# Patient Record
Sex: MALE | Race: WHITE | HISPANIC OR LATINO
[De-identification: names, ages, dates, MRNs, and addresses within clinical notes are randomized per-mention and may not be internally consistent; named-entity substitution may affect disease eponyms.]

---

## 2019-09-17 ENCOUNTER — APPOINTMENT (OUTPATIENT)
Dept: COLORECTAL SURGERY | Facility: CLINIC | Age: 29
End: 2019-09-17
Payer: MEDICAID

## 2019-09-17 VITALS
SYSTOLIC BLOOD PRESSURE: 126 MMHG | DIASTOLIC BLOOD PRESSURE: 82 MMHG | WEIGHT: 299 LBS | RESPIRATION RATE: 14 BRPM | HEIGHT: 68 IN | BODY MASS INDEX: 45.31 KG/M2 | HEART RATE: 70 BPM

## 2019-09-17 DIAGNOSIS — K62.3 RECTAL PROLAPSE: ICD-10-CM

## 2019-09-17 PROCEDURE — 99204 OFFICE O/P NEW MOD 45 MIN: CPT

## 2019-09-27 PROBLEM — K62.3 PARTIAL RECTAL PROLAPSE: Status: ACTIVE | Noted: 2019-09-27

## 2019-09-27 NOTE — PHYSICAL EXAM
[Abdomen Masses] : No abdominal masses [Abdomen Tenderness] : ~T No ~M abdominal tenderness [Tender] : nontender [Normal rectal exam] : exam was normal [Manually Reducible] : a manually reducible (grade III) [Tender, Swollen] : tender, swollen [Tight] : was tight [Posterior] : posteriorly [None] : there was no rectal mass  [JVD] : no jugular venous distention  [Normal Breath Sounds] : Normal breath sounds [Normal Heart Sounds] : normal heart sounds [Normal Rate and Rhythm] : normal rate and rhythm [No Rash or Lesion] : No rash or lesion [Alert] : alert [Oriented to Person] : oriented to person [Oriented to Place] : oriented to place [Oriented to Time] : oriented to time [Calm] : calm [de-identified] : Obese [de-identified] : Grade 3 [de-identified] : Looks well in no distress, of stated age. [de-identified] : pupils equal reactive to light normocephalic atraumatic. [de-identified] : moves all 4 extremities appropriately with 5 over 5 strength

## 2019-09-27 NOTE — HISTORY OF PRESENT ILLNESS
[FreeTextEntry1] : 29-year-old male with long-standing history of prolapsing bleeding hemorrhoids or who had a hemorrhage surgery a year ago still with symptoms of bleeding and prolapsing hemorrhoids and painful bowel movements

## 2019-09-27 NOTE — ASSESSMENT
[FreeTextEntry1] : 29-year-old male with recurrence of extensive hemorrhoids and bleeding. Recommend surgery with hemorrhoidectomy repair prolapse possible fissurectomy.Risk and benefits of the surgery have been discussed which include bleeding, infection, sepsis, multiorgan failure, inadvertent injury including hollow viscus, solid organ, neurovascular and neurological structures, DVT PE, heart attack, stroke, hernias, recurrence, Incontinence and death.

## 2019-10-01 ENCOUNTER — OTHER (OUTPATIENT)
Age: 29
End: 2019-10-01

## 2019-10-01 RX ORDER — HYDROCORTISONE 25 MG/G
2.5 CREAM TOPICAL TWICE DAILY
Qty: 1 | Refills: 3 | Status: ACTIVE | COMMUNITY
Start: 2019-10-01 | End: 1900-01-01

## 2019-12-27 ENCOUNTER — OUTPATIENT (OUTPATIENT)
Dept: OUTPATIENT SERVICES | Facility: HOSPITAL | Age: 29
LOS: 1 days | End: 2019-12-27
Payer: MEDICAID

## 2019-12-27 VITALS
WEIGHT: 315 LBS | TEMPERATURE: 98 F | HEART RATE: 80 BPM | RESPIRATION RATE: 14 BRPM | DIASTOLIC BLOOD PRESSURE: 78 MMHG | OXYGEN SATURATION: 98 % | SYSTOLIC BLOOD PRESSURE: 114 MMHG | HEIGHT: 67 IN

## 2019-12-27 DIAGNOSIS — Z98.890 OTHER SPECIFIED POSTPROCEDURAL STATES: Chronic | ICD-10-CM

## 2019-12-27 DIAGNOSIS — Z98.89 OTHER SPECIFIED POSTPROCEDURAL STATES: Chronic | ICD-10-CM

## 2019-12-27 DIAGNOSIS — Z01.818 ENCOUNTER FOR OTHER PREPROCEDURAL EXAMINATION: ICD-10-CM

## 2019-12-27 DIAGNOSIS — K64.9 UNSPECIFIED HEMORRHOIDS: Chronic | ICD-10-CM

## 2019-12-27 DIAGNOSIS — K64.8 OTHER HEMORRHOIDS: ICD-10-CM

## 2019-12-27 LAB
ANION GAP SERPL CALC-SCNC: 4 MMOL/L — LOW (ref 5–17)
APTT BLD: 31.8 SEC — SIGNIFICANT CHANGE UP (ref 27.5–36.3)
BASOPHILS # BLD AUTO: 0.03 K/UL — SIGNIFICANT CHANGE UP (ref 0–0.2)
BASOPHILS NFR BLD AUTO: 0.4 % — SIGNIFICANT CHANGE UP (ref 0–2)
BUN SERPL-MCNC: 12 MG/DL — SIGNIFICANT CHANGE UP (ref 7–23)
CALCIUM SERPL-MCNC: 8.8 MG/DL — SIGNIFICANT CHANGE UP (ref 8.5–10.1)
CHLORIDE SERPL-SCNC: 106 MMOL/L — SIGNIFICANT CHANGE UP (ref 96–108)
CO2 SERPL-SCNC: 27 MMOL/L — SIGNIFICANT CHANGE UP (ref 22–31)
CREAT SERPL-MCNC: 0.82 MG/DL — SIGNIFICANT CHANGE UP (ref 0.5–1.3)
EOSINOPHIL # BLD AUTO: 0.28 K/UL — SIGNIFICANT CHANGE UP (ref 0–0.5)
EOSINOPHIL NFR BLD AUTO: 3.6 % — SIGNIFICANT CHANGE UP (ref 0–6)
GLUCOSE SERPL-MCNC: 74 MG/DL — SIGNIFICANT CHANGE UP (ref 70–99)
HCT VFR BLD CALC: 45.9 % — SIGNIFICANT CHANGE UP (ref 39–50)
HGB BLD-MCNC: 15.4 G/DL — SIGNIFICANT CHANGE UP (ref 13–17)
IMM GRANULOCYTES NFR BLD AUTO: 0.4 % — SIGNIFICANT CHANGE UP (ref 0–1.5)
INR BLD: 0.92 RATIO — SIGNIFICANT CHANGE UP (ref 0.88–1.16)
LYMPHOCYTES # BLD AUTO: 2.91 K/UL — SIGNIFICANT CHANGE UP (ref 1–3.3)
LYMPHOCYTES # BLD AUTO: 37.2 % — SIGNIFICANT CHANGE UP (ref 13–44)
MCHC RBC-ENTMCNC: 24.8 PG — LOW (ref 27–34)
MCHC RBC-ENTMCNC: 33.6 GM/DL — SIGNIFICANT CHANGE UP (ref 32–36)
MCV RBC AUTO: 73.8 FL — LOW (ref 80–100)
MONOCYTES # BLD AUTO: 0.79 K/UL — SIGNIFICANT CHANGE UP (ref 0–0.9)
MONOCYTES NFR BLD AUTO: 10.1 % — SIGNIFICANT CHANGE UP (ref 2–14)
NEUTROPHILS # BLD AUTO: 3.79 K/UL — SIGNIFICANT CHANGE UP (ref 1.8–7.4)
NEUTROPHILS NFR BLD AUTO: 48.3 % — SIGNIFICANT CHANGE UP (ref 43–77)
PLATELET # BLD AUTO: 246 K/UL — SIGNIFICANT CHANGE UP (ref 150–400)
POTASSIUM SERPL-MCNC: 4.1 MMOL/L — SIGNIFICANT CHANGE UP (ref 3.5–5.3)
POTASSIUM SERPL-SCNC: 4.1 MMOL/L — SIGNIFICANT CHANGE UP (ref 3.5–5.3)
PROTHROM AB SERPL-ACNC: 10.2 SEC — SIGNIFICANT CHANGE UP (ref 10–12.9)
RBC # BLD: 6.22 M/UL — HIGH (ref 4.2–5.8)
RBC # FLD: 14.5 % — SIGNIFICANT CHANGE UP (ref 10.3–14.5)
SODIUM SERPL-SCNC: 137 MMOL/L — SIGNIFICANT CHANGE UP (ref 135–145)
WBC # BLD: 7.83 K/UL — SIGNIFICANT CHANGE UP (ref 3.8–10.5)
WBC # FLD AUTO: 7.83 K/UL — SIGNIFICANT CHANGE UP (ref 3.8–10.5)

## 2019-12-27 PROCEDURE — 93010 ELECTROCARDIOGRAM REPORT: CPT

## 2019-12-27 PROCEDURE — 85025 COMPLETE CBC W/AUTO DIFF WBC: CPT

## 2019-12-27 PROCEDURE — 71046 X-RAY EXAM CHEST 2 VIEWS: CPT

## 2019-12-27 PROCEDURE — 36415 COLL VENOUS BLD VENIPUNCTURE: CPT

## 2019-12-27 PROCEDURE — 80048 BASIC METABOLIC PNL TOTAL CA: CPT

## 2019-12-27 PROCEDURE — 71046 X-RAY EXAM CHEST 2 VIEWS: CPT | Mod: 26

## 2019-12-27 PROCEDURE — 85610 PROTHROMBIN TIME: CPT

## 2019-12-27 PROCEDURE — 86901 BLOOD TYPING SEROLOGIC RH(D): CPT

## 2019-12-27 PROCEDURE — 86900 BLOOD TYPING SEROLOGIC ABO: CPT

## 2019-12-27 PROCEDURE — 85730 THROMBOPLASTIN TIME PARTIAL: CPT

## 2019-12-27 PROCEDURE — G0463: CPT | Mod: 25

## 2019-12-27 PROCEDURE — 93005 ELECTROCARDIOGRAM TRACING: CPT

## 2019-12-27 PROCEDURE — 86850 RBC ANTIBODY SCREEN: CPT

## 2019-12-27 NOTE — H&P PST ADULT - HISTORY OF PRESENT ILLNESS
30 yo male presents to PST. c/o hemorrhoids "for years". Reports having banding procedure with anesthesia with a different MD approx 1 year ago. Denies relief of pain or bleeding after BMs. Consulted with Dr Panda & is coming in for hemorrhoidectomy. Is currently using preparation H with little relief. Pt plans on calling surgeon's office to get different topical medication.

## 2019-12-27 NOTE — H&P PST ADULT - MUSCULOSKELETAL COMMENTS
followed by spine MD for low back pain. Reports he will start Physical therapy after hemorrhoid surgery

## 2019-12-27 NOTE — H&P PST ADULT - NSICDXPASTSURGICALHX_GEN_ALL_CORE_FT
PAST SURGICAL HISTORY:  H/O colonoscopy     Hemorrhoid hemorrhoid banding 2018    History of esophagogastroduodenoscopy (EGD)     S/P nasal surgery fracture

## 2019-12-27 NOTE — H&P PST ADULT - ASSESSMENT
30 yo male scheduled for hemorrhoidectomy on 1/6/20  with Dr. Panda     1. CBC w diff, BMP, PTT, PT/INR, T&S  2. EKG  3. discussed EZ sponges, NPO after MN & PST day of procedure instructions  4. Instructed to increase po fluids the day before surgery. Instructed to hold aspirin, NSAIDs, fish oil, vitamins & herbal supplements 7 days prior to surgery  5. CXR

## 2019-12-27 NOTE — H&P PST ADULT - NSCAGESTDRUGGUILT_GEN_A_CORE_SD
PHYSICAL THERAPY - DAILY TREATMENT NOTE    Patient Name: Dontae Tovar        Date: 10/29/2019  : 1967   YES Patient  Verified  Visit #:     Insurance: Payor: Alejandra Gill / Plan: [id number] CCC[facility] / Product Type: Managed Care Medicaid /      In time: 11:00 Out time: 11:35   Total Treatment Time: 35     Medicare Time Tracking (below)   Total Timed Codes (min):  na 1:1 Treatment Time:  na     TREATMENT AREA =  Low back pain [M54.5]    SUBJECTIVE  Pain Level (on 0 to 10 scale):    Medication Changes/New allergies or changes in medical history, any new surgeries or procedures? NO    If yes, update Summary List   Subjective Functional Status/Changes:  []  No changes reported     SEE IE          OBJECTIVE    10 min Manual Therapy: DTM para, shot gun tech, L3-5 L FRS rae   Rationale:      decrease pain, increase ROM and increase tissue extensibility to improve patient's ability to perform ADLs    5 min Self Care: Bridge and t/s ROt   Rationale:    increase ROM, increase strength and improve coordination to improve the patients ability to perform ADLs      Billed With/As:   [] TE   [] TA   [] Neuro   [x] Self Care Patient Education: [x] Review HEP    [] Progressed/Changed HEP based on:   [] positioning   [] body mechanics   [] transfers   [] heat/ice application    [] other:       min Patient Education:  YES  Reviewed HEP   []  Progressed/Changed HEP based on:         Other Objective/Functional Measures:    SEE IE     Post Treatment Pain Level (on 0 to 10) scale:       ASSESSMENT  Assessment/Changes in Function:     SEE IE     []  See Progress Note/Recertification   Patient will continue to benefit from skilled PT services to modify and progress therapeutic interventions, address functional mobility deficits, address ROM deficits, address strength deficits, analyze and address soft tissue restrictions, analyze and cue movement patterns, analyze and modify body mechanics/ergonomics and assess and modify postural abnormalities to attain remaining goals.    Progress toward goals / Updated goals:         PLAN  []  Upgrade activities as tolerated YES Continue plan of care   []  Discharge due to :    []  Other:      Therapist: Kathi Ly, PT, OCS, SCS, CSCS    Date: 10/29/2019 Time: 1:35 PM       Future Appointments   Date Time Provider Erica Barron   11/6/2019  2:30 PM Victor Hugo [facility]   11/13/2019  2:30 PM Beny Velazquez [facility]   11/20/2019  2:30 PM Paulette Gay [facility]   11/27/2019  2:30 PM Victor Hugo [facility]   12/12/2019  2:30 PM Julio C Kitchen NP [facility] no

## 2019-12-27 NOTE — H&P PST ADULT - NSICDXPASTMEDICALHX_GEN_ALL_CORE_FT
PAST MEDICAL HISTORY:  Bulging lumbar disc     Chronic urticaria     Difficulty urinating on tamsulosin    Gastroesophageal reflux disease without esophagitis     Hemorrhoids     Hypertension     Low back pain     Morbid obesity     Rectal pain     Sleep apnea uses CPAP machine

## 2019-12-28 DIAGNOSIS — Z01.818 ENCOUNTER FOR OTHER PREPROCEDURAL EXAMINATION: ICD-10-CM

## 2019-12-28 DIAGNOSIS — K64.8 OTHER HEMORRHOIDS: ICD-10-CM

## 2019-12-31 ENCOUNTER — OTHER (OUTPATIENT)
Age: 29
End: 2019-12-31

## 2019-12-31 PROBLEM — I10 ESSENTIAL (PRIMARY) HYPERTENSION: Chronic | Status: ACTIVE | Noted: 2019-12-27

## 2019-12-31 PROBLEM — K64.9 UNSPECIFIED HEMORRHOIDS: Chronic | Status: ACTIVE | Noted: 2019-12-27

## 2019-12-31 PROBLEM — M51.26 OTHER INTERVERTEBRAL DISC DISPLACEMENT, LUMBAR REGION: Chronic | Status: ACTIVE | Noted: 2019-12-27

## 2019-12-31 PROBLEM — G47.30 SLEEP APNEA, UNSPECIFIED: Chronic | Status: ACTIVE | Noted: 2019-12-27

## 2019-12-31 PROBLEM — L50.8 OTHER URTICARIA: Chronic | Status: ACTIVE | Noted: 2019-12-27

## 2019-12-31 PROBLEM — K62.89 OTHER SPECIFIED DISEASES OF ANUS AND RECTUM: Chronic | Status: ACTIVE | Noted: 2019-12-27

## 2019-12-31 PROBLEM — E66.01 MORBID (SEVERE) OBESITY DUE TO EXCESS CALORIES: Chronic | Status: ACTIVE | Noted: 2019-12-27

## 2019-12-31 PROBLEM — R39.198 OTHER DIFFICULTIES WITH MICTURITION: Chronic | Status: ACTIVE | Noted: 2019-12-27

## 2019-12-31 PROBLEM — M54.5 LOW BACK PAIN: Chronic | Status: ACTIVE | Noted: 2019-12-27

## 2019-12-31 RX ORDER — HYDROCORTISONE 25 MG/G
2.5 CREAM TOPICAL TWICE DAILY
Qty: 1 | Refills: 4 | Status: ACTIVE | COMMUNITY
Start: 2019-12-31 | End: 1900-01-01

## 2020-01-03 RX ORDER — SODIUM CHLORIDE 9 MG/ML
1000 INJECTION, SOLUTION INTRAVENOUS
Refills: 0 | Status: DISCONTINUED | OUTPATIENT
Start: 2020-01-06 | End: 2020-01-06

## 2020-01-03 RX ORDER — OXYCODONE HYDROCHLORIDE 5 MG/1
10 TABLET ORAL ONCE
Refills: 0 | Status: DISCONTINUED | OUTPATIENT
Start: 2020-01-06 | End: 2020-01-06

## 2020-01-03 RX ORDER — FENTANYL CITRATE 50 UG/ML
50 INJECTION INTRAVENOUS
Refills: 0 | Status: DISCONTINUED | OUTPATIENT
Start: 2020-01-06 | End: 2020-01-06

## 2020-01-06 ENCOUNTER — APPOINTMENT (OUTPATIENT)
Dept: COLORECTAL SURGERY | Facility: HOSPITAL | Age: 30
End: 2020-01-06
Payer: MEDICAID

## 2020-01-06 ENCOUNTER — OUTPATIENT (OUTPATIENT)
Dept: INPATIENT UNIT | Facility: HOSPITAL | Age: 30
LOS: 1 days | Discharge: HOME | End: 2020-01-06
Payer: MEDICAID

## 2020-01-06 ENCOUNTER — RESULT REVIEW (OUTPATIENT)
Age: 30
End: 2020-01-06

## 2020-01-06 VITALS
DIASTOLIC BLOOD PRESSURE: 95 MMHG | OXYGEN SATURATION: 98 % | WEIGHT: 315 LBS | TEMPERATURE: 99 F | HEIGHT: 67 IN | HEART RATE: 91 BPM | SYSTOLIC BLOOD PRESSURE: 148 MMHG | RESPIRATION RATE: 16 BRPM

## 2020-01-06 VITALS
SYSTOLIC BLOOD PRESSURE: 139 MMHG | HEART RATE: 82 BPM | TEMPERATURE: 98 F | OXYGEN SATURATION: 97 % | DIASTOLIC BLOOD PRESSURE: 89 MMHG | RESPIRATION RATE: 16 BRPM

## 2020-01-06 DIAGNOSIS — K64.9 UNSPECIFIED HEMORRHOIDS: Chronic | ICD-10-CM

## 2020-01-06 DIAGNOSIS — Z98.89 OTHER SPECIFIED POSTPROCEDURAL STATES: Chronic | ICD-10-CM

## 2020-01-06 DIAGNOSIS — Z98.890 OTHER SPECIFIED POSTPROCEDURAL STATES: Chronic | ICD-10-CM

## 2020-01-06 DIAGNOSIS — K64.8 OTHER HEMORRHOIDS: ICD-10-CM

## 2020-01-06 PROCEDURE — 46260 REMOVE IN/EX HEM GROUPS 2+: CPT

## 2020-01-06 PROCEDURE — 45505 REPAIR OF RECTUM: CPT | Mod: 59

## 2020-01-06 PROCEDURE — C9290: CPT

## 2020-01-06 PROCEDURE — 88304 TISSUE EXAM BY PATHOLOGIST: CPT

## 2020-01-06 PROCEDURE — 46200 REMOVAL OF ANAL FISSURE: CPT | Mod: 59

## 2020-01-06 PROCEDURE — 88304 TISSUE EXAM BY PATHOLOGIST: CPT | Mod: 26

## 2020-01-06 RX ORDER — OXYCODONE AND ACETAMINOPHEN 5; 325 MG/1; MG/1
1 TABLET ORAL EVERY 4 HOURS
Refills: 0 | Status: DISCONTINUED | OUTPATIENT
Start: 2020-01-06 | End: 2020-01-06

## 2020-01-06 RX ORDER — KETOROLAC TROMETHAMINE 30 MG/ML
1 SYRINGE (ML) INJECTION
Qty: 20 | Refills: 0
Start: 2020-01-06 | End: 2020-01-10

## 2020-01-06 RX ORDER — ONDANSETRON 8 MG/1
4 TABLET, FILM COATED ORAL ONCE
Refills: 0 | Status: COMPLETED | OUTPATIENT
Start: 2020-01-06 | End: 2020-01-06

## 2020-01-06 RX ORDER — PROCHLORPERAZINE MALEATE 5 MG
10 TABLET ORAL ONCE
Refills: 0 | Status: COMPLETED | OUTPATIENT
Start: 2020-01-06 | End: 2020-01-06

## 2020-01-06 RX ORDER — ONDANSETRON 8 MG/1
4 TABLET, FILM COATED ORAL EVERY 6 HOURS
Refills: 0 | Status: DISCONTINUED | OUTPATIENT
Start: 2020-01-06 | End: 2020-01-06

## 2020-01-06 RX ORDER — SODIUM CHLORIDE 9 MG/ML
3 INJECTION INTRAMUSCULAR; INTRAVENOUS; SUBCUTANEOUS EVERY 8 HOURS
Refills: 0 | Status: DISCONTINUED | OUTPATIENT
Start: 2020-01-06 | End: 2020-01-06

## 2020-01-06 RX ADMIN — FENTANYL CITRATE 50 MICROGRAM(S): 50 INJECTION INTRAVENOUS at 11:47

## 2020-01-06 RX ADMIN — Medication 10 MILLIGRAM(S): at 11:50

## 2020-01-06 RX ADMIN — SODIUM CHLORIDE 75 MILLILITER(S): 9 INJECTION, SOLUTION INTRAVENOUS at 11:22

## 2020-01-06 RX ADMIN — ONDANSETRON 4 MILLIGRAM(S): 8 TABLET, FILM COATED ORAL at 11:20

## 2020-01-06 RX ADMIN — FENTANYL CITRATE 50 MICROGRAM(S): 50 INJECTION INTRAVENOUS at 12:00

## 2020-01-06 NOTE — PHARMACOTHERAPY INTERVENTION NOTE - COMMENTS
Prescriber aware and approved. s/w dr tyrone md ok'd fentanyl to be given with demerol allergy. not true allergy per md. documented appoval in electronic order.

## 2020-01-06 NOTE — ASU DISCHARGE PLAN (ADULT/PEDIATRIC) - ASU DC SPECIAL INSTRUCTIONSFT
remove all gauze and shower with soap and water. apply new gauze daily and as needed. May use warm water to soak two-three times per day. may take colace and/or milk of magnesia as needed for constipation. ice packs to area as needed. May take Tylenol and/or motrin for mild pain. Take metamucil powder, fiber  daily with plenty of water.  FOLLOW UP IN 4 WEEKS.

## 2020-01-06 NOTE — ASU PATIENT PROFILE, ADULT - PMH
Bulging lumbar disc    Chronic urticaria    Difficulty urinating  on tamsulosin  Gastroesophageal reflux disease without esophagitis    Hemorrhoids    Hypertension    Low back pain    Morbid obesity    Rectal pain    Sleep apnea  uses CPAP machine

## 2020-01-06 NOTE — ASU PATIENT PROFILE, ADULT - PSH
H/O colonoscopy    Hemorrhoid  hemorrhoid banding 2018  History of esophagogastroduodenoscopy (EGD)    S/P nasal surgery  fracture

## 2020-01-06 NOTE — BRIEF OPERATIVE NOTE - NSICDXBRIEFPROCEDURE_GEN_ALL_CORE_FT
PROCEDURES:  Hemorrhoidectomy, internal and external, involving 2 or more anal columns 06-Jan-2020 10:37:39  Americo Soriano A

## 2020-01-06 NOTE — ASU DISCHARGE PLAN (ADULT/PEDIATRIC) - CARE PROVIDER_API CALL
Homero Panda)  ColonRectal Surgery; Surgery  [address] [place] [address]  Phone: [phone]  Fax: [phone]  Follow Up Time:

## 2020-01-08 DIAGNOSIS — G47.33 OBSTRUCTIVE SLEEP APNEA (ADULT) (PEDIATRIC): ICD-10-CM

## 2020-01-08 DIAGNOSIS — F12.20 CANNABIS DEPENDENCE, UNCOMPLICATED: ICD-10-CM

## 2020-01-08 DIAGNOSIS — K64.4 RESIDUAL HEMORRHOIDAL SKIN TAGS: ICD-10-CM

## 2020-01-08 DIAGNOSIS — M51.26 OTHER INTERVERTEBRAL DISC DISPLACEMENT, LUMBAR REGION: ICD-10-CM

## 2020-01-08 DIAGNOSIS — Z99.89 DEPENDENCE ON OTHER ENABLING MACHINES AND DEVICES: ICD-10-CM

## 2020-01-08 DIAGNOSIS — K21.9 GASTRO-ESOPHAGEAL REFLUX DISEASE WITHOUT ESOPHAGITIS: ICD-10-CM

## 2020-01-08 DIAGNOSIS — K64.8 OTHER HEMORRHOIDS: ICD-10-CM

## 2020-01-08 DIAGNOSIS — M54.9 DORSALGIA, UNSPECIFIED: ICD-10-CM

## 2020-01-08 DIAGNOSIS — K60.2 ANAL FISSURE, UNSPECIFIED: ICD-10-CM

## 2020-01-08 DIAGNOSIS — E66.01 MORBID (SEVERE) OBESITY DUE TO EXCESS CALORIES: ICD-10-CM

## 2020-01-08 DIAGNOSIS — I10 ESSENTIAL (PRIMARY) HYPERTENSION: ICD-10-CM

## 2020-01-13 RX ORDER — OXYCODONE AND ACETAMINOPHEN 5; 325 MG/1; MG/1
5-325 TABLET ORAL
Qty: 28 | Refills: 0 | Status: ACTIVE | COMMUNITY
Start: 2020-01-13 | End: 1900-01-01

## 2020-01-13 RX ORDER — POLYETHYLENE GLYCOL 3350 17 G/17G
17 POWDER, FOR SOLUTION ORAL DAILY
Qty: 1 | Refills: 2 | Status: ACTIVE | COMMUNITY
Start: 2020-01-13 | End: 1900-01-01

## 2020-01-14 RX ORDER — IBUPROFEN 600 MG/1
600 TABLET, FILM COATED ORAL
Qty: 28 | Refills: 0 | Status: ACTIVE | COMMUNITY
Start: 2020-01-14 | End: 1900-01-01

## 2020-01-14 RX ORDER — KETOROLAC TROMETHAMINE 10 MG/1
10 TABLET, FILM COATED ORAL EVERY 6 HOURS
Qty: 20 | Refills: 0 | Status: DISCONTINUED | COMMUNITY
Start: 2020-01-13 | End: 2020-01-14

## 2020-01-18 RX ORDER — LIDOCAINE 4 G/100G
1 CREAM TOPICAL
Qty: 1 | Refills: 0
Start: 2020-01-18

## 2020-01-18 RX ORDER — LIDOCAINE 4 G/100G
1 CREAM TOPICAL
Qty: 1 | Refills: 0
Start: 2020-01-18 | End: 2020-01-31

## 2020-01-18 RX ORDER — LIDOCAINE 4 G/100G
1 CREAM TOPICAL
Qty: 1 | Refills: 2
Start: 2020-01-18 | End: 2020-02-01

## 2020-02-07 ENCOUNTER — APPOINTMENT (OUTPATIENT)
Dept: COLORECTAL SURGERY | Facility: CLINIC | Age: 30
End: 2020-02-07
Payer: MEDICAID

## 2020-02-07 VITALS
TEMPERATURE: 98.5 F | HEIGHT: 68 IN | HEART RATE: 86 BPM | DIASTOLIC BLOOD PRESSURE: 95 MMHG | SYSTOLIC BLOOD PRESSURE: 143 MMHG | RESPIRATION RATE: 16 BRPM

## 2020-02-07 PROCEDURE — 99024 POSTOP FOLLOW-UP VISIT: CPT

## 2020-02-07 NOTE — HISTORY OF PRESENT ILLNESS
[FreeTextEntry1] : 30-year-old male postop hemorrhage surgery. Doing much better denies any pain or bleeding

## 2020-02-07 NOTE — PHYSICAL EXAM
[Normal rectal exam] : exam was normal [Excoriation] : no perianal excoriation [Fistula] : no fistulas [Tender, Swollen] : nontender, non-swollen [Normal] : was normal [None] : there was no rectal mass  [de-identified] : Looks well in no distress, of stated age.

## 2020-02-07 NOTE — ASSESSMENT
[FreeTextEntry1] : Postop hemorrhage surgery doing well.Recommend high fiber diet, Metamucil daily, sitz baths, stool softeners, pain medications p.r.n. also recommend consultation with bariatric surgery and her weight reduction

## 2020-09-04 ENCOUNTER — APPOINTMENT (OUTPATIENT)
Dept: COLORECTAL SURGERY | Facility: CLINIC | Age: 30
End: 2020-09-04

## 2020-09-15 ENCOUNTER — APPOINTMENT (OUTPATIENT)
Dept: COLORECTAL SURGERY | Facility: CLINIC | Age: 30
End: 2020-09-15
Payer: MEDICAID

## 2020-09-15 VITALS
BODY MASS INDEX: 47.74 KG/M2 | HEART RATE: 103 BPM | RESPIRATION RATE: 14 BRPM | SYSTOLIC BLOOD PRESSURE: 139 MMHG | WEIGHT: 315 LBS | TEMPERATURE: 97.1 F | HEIGHT: 68 IN | DIASTOLIC BLOOD PRESSURE: 89 MMHG

## 2020-09-15 DIAGNOSIS — K64.8 OTHER HEMORRHOIDS: ICD-10-CM

## 2020-09-15 PROCEDURE — 99213 OFFICE O/P EST LOW 20 MIN: CPT

## 2020-09-16 PROBLEM — K64.8 HEMORRHOIDS, INTERNAL: Status: ACTIVE | Noted: 2019-09-27

## 2020-09-16 NOTE — PHYSICAL EXAM
[Normal rectal exam] : exam was normal [Fistula] : no fistulas [Excoriation] : no perianal excoriation [Normal] : was normal [Tender, Swollen] : nontender, non-swollen [None] : no [de-identified] : Looks well in no distress, of stated age.

## 2020-10-06 ENCOUNTER — APPOINTMENT (OUTPATIENT)
Dept: UROLOGY | Facility: CLINIC | Age: 30
End: 2020-10-06

## 2020-11-10 ENCOUNTER — APPOINTMENT (OUTPATIENT)
Dept: UROLOGY | Facility: CLINIC | Age: 30
End: 2020-11-10

## 2020-11-24 ENCOUNTER — APPOINTMENT (OUTPATIENT)
Dept: PULMONOLOGY | Facility: CLINIC | Age: 30
End: 2020-11-24

## 2021-02-19 ENCOUNTER — RESULT REVIEW (OUTPATIENT)
Age: 31
End: 2021-02-19

## 2021-02-23 ENCOUNTER — APPOINTMENT (OUTPATIENT)
Dept: UROLOGY | Facility: CLINIC | Age: 31
End: 2021-02-23
Payer: MEDICAID

## 2021-02-23 VITALS
HEIGHT: 68 IN | SYSTOLIC BLOOD PRESSURE: 127 MMHG | BODY MASS INDEX: 47.74 KG/M2 | HEART RATE: 96 BPM | WEIGHT: 315 LBS | RESPIRATION RATE: 16 BRPM | TEMPERATURE: 97.6 F | DIASTOLIC BLOOD PRESSURE: 82 MMHG

## 2021-02-23 DIAGNOSIS — R39.9 UNSPECIFIED SYMPTOMS AND SIGNS INVOLVING THE GENITOURINARY SYSTEM: ICD-10-CM

## 2021-02-23 DIAGNOSIS — N50.82 SCROTAL PAIN: ICD-10-CM

## 2021-02-23 LAB
BILIRUB UR QL STRIP: NORMAL
CLARITY UR: CLEAR
COLLECTION METHOD: NORMAL
GLUCOSE UR-MCNC: NORMAL
HCG UR QL: 0.2 EU/DL
HGB UR QL STRIP.AUTO: NORMAL
KETONES UR-MCNC: NORMAL
LEUKOCYTE ESTERASE UR QL STRIP: NORMAL
NITRITE UR QL STRIP: NORMAL
PH UR STRIP: 5.5
PROT UR STRIP-MCNC: NORMAL
SP GR UR STRIP: 1.02

## 2021-02-23 PROCEDURE — 99204 OFFICE O/P NEW MOD 45 MIN: CPT | Mod: 25

## 2021-02-23 PROCEDURE — 99072 ADDL SUPL MATRL&STAF TM PHE: CPT

## 2021-02-23 PROCEDURE — 81003 URINALYSIS AUTO W/O SCOPE: CPT | Mod: QW

## 2021-02-23 NOTE — ASSESSMENT
[FreeTextEntry1] : Lower urinary tract symptoms:\par Discussed concern for urethral stricture. \par Recommended Uroflo/PVR and Cystoscopy for further work up. Patient agreeable.\par Continue Flomax.  \par \par Scrotal pain: \par Will get Scrotal Ultrasound. \par \par Return to clinic for next available Cystoscopy- will do Uroflo/PVR.

## 2021-02-23 NOTE — PHYSICAL EXAM
[General Appearance - In No Acute Distress] : no acute distress [Normal Appearance] : normal appearance [] : no respiratory distress [Abdomen Soft] : soft [Abdomen Tenderness] : non-tender [Costovertebral Angle Tenderness] : no ~M costovertebral angle tenderness [Urethral Meatus] : meatus normal [Penis Abnormality] : normal circumcised penis [Scrotum] : the scrotum was normal [Epididymis] : the epididymides were normal [Testes Tenderness] : no tenderness of the testes [Testes Mass (___cm)] : there were no testicular masses [Prostate Tenderness] : the prostate was not tender [No Prostate Nodules] : no prostate nodules [Prostate Size ___ (0-4)] : prostate size [unfilled] (scale: 0-4) [Normal Station and Gait] : the gait and station were normal for the patient's age [Skin Color & Pigmentation] : normal skin color and pigmentation [No Focal Deficits] : no focal deficits [Oriented To Time, Place, And Person] : oriented to person, place, and time [No Palpable Adenopathy] : no palpable adenopathy [FreeTextEntry1] : normal peripheral circulation

## 2021-02-23 NOTE — HISTORY OF PRESENT ILLNESS
[FreeTextEntry1] : 32 yo male presents for lower urinary tract symptoms. \par On Flomax. Reports variable stream, urinates every 2-3 hours or so during the day. Nocturia of 0-1 x. \par Endorses hesitancy, intermittency and post void dribbling. \par Denies dysuria, hematuria, lower abdominal or flank pain, fever, chills or rigors.\par Complaining of off and on scrotal pain. \par No history of trauma, catheterization or instrumentation. No history of sexually transmitted disease. \par Saw a Urologist last year and was told has enlarged prostate, before Flomax nocturia 1-2 x and had more \par hesitancy, intermittency and post void dribbling. Does not remember the name. \par Initially had retrograde ejaculation with Flomax, not anymore. \par Normal erections and libido. \par No family history of Prostate cancer. \par \par \par

## 2021-02-24 ENCOUNTER — TRANSCRIPTION ENCOUNTER (OUTPATIENT)
Age: 31
End: 2021-02-24

## 2021-03-09 ENCOUNTER — APPOINTMENT (OUTPATIENT)
Dept: UROLOGY | Facility: CLINIC | Age: 31
End: 2021-03-09

## 2022-02-01 ENCOUNTER — APPOINTMENT (OUTPATIENT)
Dept: COLORECTAL SURGERY | Facility: CLINIC | Age: 32
End: 2022-02-01
Payer: COMMERCIAL

## 2022-02-01 DIAGNOSIS — K62.5 HEMORRHAGE OF ANUS AND RECTUM: ICD-10-CM

## 2022-02-01 PROCEDURE — 99214 OFFICE O/P EST MOD 30 MIN: CPT | Mod: 25

## 2022-02-01 PROCEDURE — 46600 DIAGNOSTIC ANOSCOPY SPX: CPT

## 2022-02-02 PROBLEM — K62.5 ANAL BLEEDING: Status: ACTIVE | Noted: 2019-09-27

## 2022-02-02 NOTE — ASSESSMENT
[FreeTextEntry1] : 32-year-old male with recurrence of extensive hemorrhoids and bleeding. Recommend colonoscopy. Risks and benefits of colonoscopy have been discussed which include but not limited to bleeding, perforation, missing a cancer or polyp occurring 5%.\par Recommend high fiber diet, Metamucil daily, sitz baths, stool softeners, pain medications p.r.n.Recommend weight loss followup bariatric surgery

## 2022-02-02 NOTE — HISTORY OF PRESENT ILLNESS
[FreeTextEntry1] : 32year-old male with long-standing history of prolapsing bleeding hemorrhoids or who had a Hemorrhoid surgery a year ago still with symptoms of bleeding and prolapsing hemorrhoids and painful bowel movements. He is also gained a significant amount of weight

## 2022-02-02 NOTE — PHYSICAL EXAM
[Abdomen Masses] : No abdominal masses [Abdomen Tenderness] : ~T No ~M abdominal tenderness [Tender] : nontender [Normal rectal exam] : exam was normal [Manually Reducible] : a manually reducible (grade III) [Tender, Swollen] : tender, swollen [Tight] : was tight [Posterior] : posteriorly [None] : there was no rectal mass  [JVD] : no jugular venous distention  [Normal Breath Sounds] : Normal breath sounds [Normal Heart Sounds] : normal heart sounds [Normal Rate and Rhythm] : normal rate and rhythm [No Rash or Lesion] : No rash or lesion [Alert] : alert [Oriented to Person] : oriented to person [Oriented to Place] : oriented to place [Oriented to Time] : oriented to time [Calm] : calm [de-identified] : Obese [de-identified] : Grade 3 [de-identified] : Looks well in no distress, of stated age. [de-identified] : pupils equal reactive to light normocephalic atraumatic. [de-identified] : moves all 4 extremities appropriately with 5 over 5 strength

## 2022-03-07 RX ORDER — SODIUM SULFATE, POTASSIUM SULFATE, MAGNESIUM SULFATE 17.5; 3.13; 1.6 G/ML; G/ML; G/ML
17.5-3.13-1.6 SOLUTION, CONCENTRATE ORAL
Qty: 1 | Refills: 0 | Status: ACTIVE | COMMUNITY
Start: 2022-03-07 | End: 1900-01-01

## 2022-03-21 ENCOUNTER — NON-APPOINTMENT (OUTPATIENT)
Age: 32
End: 2022-03-21

## 2022-03-22 ENCOUNTER — APPOINTMENT (OUTPATIENT)
Dept: COLORECTAL SURGERY | Facility: CLINIC | Age: 32
End: 2022-03-22

## 2022-05-19 ENCOUNTER — APPOINTMENT (OUTPATIENT)
Dept: GASTROENTEROLOGY | Facility: CLINIC | Age: 32
End: 2022-05-19

## 2022-05-26 ENCOUNTER — APPOINTMENT (OUTPATIENT)
Dept: GASTROENTEROLOGY | Facility: CLINIC | Age: 32
End: 2022-05-26
Payer: COMMERCIAL

## 2022-05-26 VITALS — HEIGHT: 68 IN

## 2022-05-26 DIAGNOSIS — K21.9 GASTRO-ESOPHAGEAL REFLUX DISEASE W/OUT ESOPHAGITIS: ICD-10-CM

## 2022-05-26 PROCEDURE — 99204 OFFICE O/P NEW MOD 45 MIN: CPT

## 2022-05-27 PROBLEM — K21.9 CHRONIC GERD: Status: ACTIVE | Noted: 2022-05-27

## 2022-05-27 NOTE — HISTORY OF PRESENT ILLNESS
[de-identified] : Mr. CHANTELLE BROWNE is a 32 year old male with history of morbid obesity being evaluated for possible gastric bypass. Patient has no complaints of dysphagia. Patient does take daily omeprazole and has no complaints of heartburn. Patient has had several been having intermittent rectal bleeding and has been scheduled for colonoscopy. There is no family history of colon cancer.\par

## 2022-05-27 NOTE — ASSESSMENT
[FreeTextEntry1] : 33 yo male with history of GERD and history of rectal bleeding. Will arrange colonoscopy and upper endoscopy at [facility].

## 2022-06-22 LAB — SARS-COV-2 N GENE NPH QL NAA+PROBE: NOT DETECTED

## 2022-06-24 ENCOUNTER — APPOINTMENT (OUTPATIENT)
Dept: GASTROENTEROLOGY | Facility: HOSPITAL | Age: 32
End: 2022-06-24

## 2022-06-24 ENCOUNTER — RESULT REVIEW (OUTPATIENT)
Age: 32
End: 2022-06-24

## 2022-06-24 ENCOUNTER — OUTPATIENT (OUTPATIENT)
Dept: OUTPATIENT SERVICES | Facility: HOSPITAL | Age: 32
LOS: 1 days | Discharge: HOME | End: 2022-06-24
Payer: COMMERCIAL

## 2022-06-24 VITALS
SYSTOLIC BLOOD PRESSURE: 136 MMHG | HEIGHT: 68 IN | HEART RATE: 76 BPM | OXYGEN SATURATION: 100 % | WEIGHT: 315 LBS | DIASTOLIC BLOOD PRESSURE: 81 MMHG | TEMPERATURE: 98 F | RESPIRATION RATE: 21 BRPM

## 2022-06-24 DIAGNOSIS — Z98.89 OTHER SPECIFIED POSTPROCEDURAL STATES: Chronic | ICD-10-CM

## 2022-06-24 DIAGNOSIS — K64.9 UNSPECIFIED HEMORRHOIDS: Chronic | ICD-10-CM

## 2022-06-24 DIAGNOSIS — Z98.890 OTHER SPECIFIED POSTPROCEDURAL STATES: Chronic | ICD-10-CM

## 2022-06-24 DIAGNOSIS — K62.5 HEMORRHAGE OF ANUS AND RECTUM: ICD-10-CM

## 2022-06-24 DIAGNOSIS — K21.9 GASTRO-ESOPHAGEAL REFLUX DISEASE WITHOUT ESOPHAGITIS: ICD-10-CM

## 2022-06-24 PROCEDURE — 88312 SPECIAL STAINS GROUP 1: CPT | Mod: 26

## 2022-06-24 PROCEDURE — 43239 EGD BIOPSY SINGLE/MULTIPLE: CPT

## 2022-06-24 PROCEDURE — 88305 TISSUE EXAM BY PATHOLOGIST: CPT

## 2022-06-24 PROCEDURE — 45378 DIAGNOSTIC COLONOSCOPY: CPT

## 2022-06-24 PROCEDURE — 88312 SPECIAL STAINS GROUP 1: CPT

## 2022-06-24 PROCEDURE — 88305 TISSUE EXAM BY PATHOLOGIST: CPT | Mod: 26

## 2022-06-24 RX ORDER — TAMSULOSIN HYDROCHLORIDE 0.4 MG/1
1 CAPSULE ORAL
Qty: 0 | Refills: 0 | DISCHARGE

## 2022-06-24 RX ORDER — RANITIDINE HYDROCHLORIDE 150 MG/1
1 TABLET, FILM COATED ORAL
Qty: 0 | Refills: 0 | DISCHARGE

## 2022-06-24 RX ORDER — AMLODIPINE BESYLATE 2.5 MG/1
1 TABLET ORAL
Qty: 0 | Refills: 0 | DISCHARGE

## 2022-06-24 NOTE — ASU PATIENT PROFILE, ADULT - FALL HARM RISK - UNIVERSAL INTERVENTIONS
Bed in lowest position, wheels locked, appropriate side rails in place/Call bell, personal items and telephone in reach/Instruct patient to call for assistance before getting out of bed or chair/Non-slip footwear when patient is out of bed/Rover to call system/Physically safe environment - no spills, clutter or unnecessary equipment/Purposeful Proactive Rounding/Room/bathroom lighting operational, light cord in reach

## 2022-06-24 NOTE — ASU PATIENT PROFILE, ADULT - NSICDXPASTMEDICALHX_GEN_ALL_CORE_FT
PAST MEDICAL HISTORY:  Bulging lumbar disc     Chronic urticaria     Difficulty urinating on tamsulosin    Gastroesophageal reflux disease without esophagitis     Hemorrhoids     Hypertension     Low back pain     Morbid obesity     Rectal pain     Sleep apnea uses CPAP machine     PAST MEDICAL HISTORY:  Asthma     Bulging lumbar disc     Chronic urticaria     Difficulty urinating on tamsulosin    Gastroesophageal reflux disease without esophagitis     Hemorrhoids     Hypertension     Low back pain     Morbid obesity     Rectal pain     Sleep apnea uses CPAP machine

## 2022-06-24 NOTE — ASU PATIENT PROFILE, ADULT - NSSUBSTANCEUSE_GEN_ALL_CORE_SD
street drug/inhalant/medication abuse/caffeine never used/street drug/inhalant/medication abuse/caffeine

## 2022-06-27 PROBLEM — J45.909 UNSPECIFIED ASTHMA, UNCOMPLICATED: Chronic | Status: ACTIVE | Noted: 2022-06-24

## 2022-06-28 ENCOUNTER — NON-APPOINTMENT (OUTPATIENT)
Age: 32
End: 2022-06-28

## 2022-06-28 LAB — SURGICAL PATHOLOGY STUDY: SIGNIFICANT CHANGE UP

## 2022-06-29 DIAGNOSIS — I10 ESSENTIAL (PRIMARY) HYPERTENSION: ICD-10-CM

## 2022-06-29 DIAGNOSIS — K92.1 MELENA: ICD-10-CM

## 2022-06-29 DIAGNOSIS — Z87.891 PERSONAL HISTORY OF NICOTINE DEPENDENCE: ICD-10-CM

## 2022-06-29 DIAGNOSIS — R10.13 EPIGASTRIC PAIN: ICD-10-CM

## 2022-06-29 DIAGNOSIS — K64.8 OTHER HEMORRHOIDS: ICD-10-CM

## 2022-06-29 DIAGNOSIS — K29.50 UNSPECIFIED CHRONIC GASTRITIS WITHOUT BLEEDING: ICD-10-CM

## 2022-06-29 DIAGNOSIS — K21.9 GASTRO-ESOPHAGEAL REFLUX DISEASE WITHOUT ESOPHAGITIS: ICD-10-CM

## 2022-06-29 DIAGNOSIS — G47.33 OBSTRUCTIVE SLEEP APNEA (ADULT) (PEDIATRIC): ICD-10-CM

## 2022-06-29 DIAGNOSIS — E66.01 MORBID (SEVERE) OBESITY DUE TO EXCESS CALORIES: ICD-10-CM

## 2022-06-29 DIAGNOSIS — Z88.5 ALLERGY STATUS TO NARCOTIC AGENT: ICD-10-CM

## 2022-06-29 DIAGNOSIS — J45.909 UNSPECIFIED ASTHMA, UNCOMPLICATED: ICD-10-CM

## 2022-07-13 ENCOUNTER — OUTPATIENT (OUTPATIENT)
Dept: OUTPATIENT SERVICES | Facility: HOSPITAL | Age: 32
LOS: 1 days | End: 2022-07-13
Payer: COMMERCIAL

## 2022-07-13 VITALS — HEIGHT: 67 IN | WEIGHT: 315 LBS

## 2022-07-13 DIAGNOSIS — Z98.89 OTHER SPECIFIED POSTPROCEDURAL STATES: Chronic | ICD-10-CM

## 2022-07-13 DIAGNOSIS — E66.01 MORBID (SEVERE) OBESITY DUE TO EXCESS CALORIES: ICD-10-CM

## 2022-07-13 DIAGNOSIS — Z01.818 ENCOUNTER FOR OTHER PREPROCEDURAL EXAMINATION: ICD-10-CM

## 2022-07-13 DIAGNOSIS — Z98.890 OTHER SPECIFIED POSTPROCEDURAL STATES: Chronic | ICD-10-CM

## 2022-07-13 DIAGNOSIS — K64.9 UNSPECIFIED HEMORRHOIDS: Chronic | ICD-10-CM

## 2022-07-13 DIAGNOSIS — G47.30 SLEEP APNEA, UNSPECIFIED: ICD-10-CM

## 2022-07-13 DIAGNOSIS — I10 ESSENTIAL (PRIMARY) HYPERTENSION: ICD-10-CM

## 2022-07-13 LAB
ALBUMIN SERPL ELPH-MCNC: 4.4 G/DL — SIGNIFICANT CHANGE UP (ref 3.3–5)
ANION GAP SERPL CALC-SCNC: 8 MMOL/L — SIGNIFICANT CHANGE UP (ref 5–17)
APPEARANCE UR: CLEAR — SIGNIFICANT CHANGE UP
APTT BLD: 31.8 SEC — SIGNIFICANT CHANGE UP (ref 27.5–35.5)
BILIRUB UR-MCNC: NEGATIVE — SIGNIFICANT CHANGE UP
BUN SERPL-MCNC: 14 MG/DL — SIGNIFICANT CHANGE UP (ref 7–23)
CALCIUM SERPL-MCNC: 9.9 MG/DL — SIGNIFICANT CHANGE UP (ref 8.5–10.1)
CHLORIDE SERPL-SCNC: 105 MMOL/L — SIGNIFICANT CHANGE UP (ref 96–108)
CO2 SERPL-SCNC: 24 MMOL/L — SIGNIFICANT CHANGE UP (ref 22–31)
COHGB MFR BLDV: 2.6 % — SIGNIFICANT CHANGE UP
COLOR SPEC: YELLOW — SIGNIFICANT CHANGE UP
CREAT SERPL-MCNC: 1 MG/DL — SIGNIFICANT CHANGE UP (ref 0.5–1.3)
DIFF PNL FLD: NEGATIVE — SIGNIFICANT CHANGE UP
EGFR: 103 ML/MIN/1.73M2 — SIGNIFICANT CHANGE UP
GLUCOSE SERPL-MCNC: 84 MG/DL — SIGNIFICANT CHANGE UP (ref 70–99)
GLUCOSE UR QL: NEGATIVE — SIGNIFICANT CHANGE UP
HCT VFR BLD CALC: 47.4 % — SIGNIFICANT CHANGE UP (ref 39–50)
HGB BLD-MCNC: 15.4 G/DL — SIGNIFICANT CHANGE UP (ref 13–17)
INR BLD: 1.09 RATIO — SIGNIFICANT CHANGE UP (ref 0.88–1.16)
KETONES UR-MCNC: ABNORMAL
LEUKOCYTE ESTERASE UR-ACNC: ABNORMAL
MCHC RBC-ENTMCNC: 23.4 PG — LOW (ref 27–34)
MCHC RBC-ENTMCNC: 32.5 GM/DL — SIGNIFICANT CHANGE UP (ref 32–36)
MCV RBC AUTO: 72 FL — LOW (ref 80–100)
NITRITE UR-MCNC: NEGATIVE — SIGNIFICANT CHANGE UP
PH UR: 6 — SIGNIFICANT CHANGE UP (ref 5–8)
PLATELET # BLD AUTO: 267 K/UL — SIGNIFICANT CHANGE UP (ref 150–400)
POTASSIUM SERPL-MCNC: 3.9 MMOL/L — SIGNIFICANT CHANGE UP (ref 3.5–5.3)
POTASSIUM SERPL-SCNC: 3.9 MMOL/L — SIGNIFICANT CHANGE UP (ref 3.5–5.3)
PROT UR-MCNC: 15
PROTHROM AB SERPL-ACNC: 12.7 SEC — SIGNIFICANT CHANGE UP (ref 10.5–13.4)
RBC # BLD: 6.58 M/UL — HIGH (ref 4.2–5.8)
RBC # FLD: 15.1 % — HIGH (ref 10.3–14.5)
SODIUM SERPL-SCNC: 137 MMOL/L — SIGNIFICANT CHANGE UP (ref 135–145)
SP GR SPEC: 1.01 — SIGNIFICANT CHANGE UP (ref 1.01–1.02)
UROBILINOGEN FLD QL: NEGATIVE — SIGNIFICANT CHANGE UP
WBC # BLD: 9.56 K/UL — SIGNIFICANT CHANGE UP (ref 3.8–10.5)
WBC # FLD AUTO: 9.56 K/UL — SIGNIFICANT CHANGE UP (ref 3.8–10.5)

## 2022-07-13 PROCEDURE — 82040 ASSAY OF SERUM ALBUMIN: CPT

## 2022-07-13 PROCEDURE — 85730 THROMBOPLASTIN TIME PARTIAL: CPT

## 2022-07-13 PROCEDURE — 82375 ASSAY CARBOXYHB QUANT: CPT

## 2022-07-13 PROCEDURE — 86900 BLOOD TYPING SEROLOGIC ABO: CPT

## 2022-07-13 PROCEDURE — 85027 COMPLETE CBC AUTOMATED: CPT

## 2022-07-13 PROCEDURE — 80048 BASIC METABOLIC PNL TOTAL CA: CPT

## 2022-07-13 PROCEDURE — 93010 ELECTROCARDIOGRAM REPORT: CPT

## 2022-07-13 PROCEDURE — 93005 ELECTROCARDIOGRAM TRACING: CPT

## 2022-07-13 PROCEDURE — 86901 BLOOD TYPING SEROLOGIC RH(D): CPT

## 2022-07-13 PROCEDURE — 36415 COLL VENOUS BLD VENIPUNCTURE: CPT

## 2022-07-13 PROCEDURE — 86850 RBC ANTIBODY SCREEN: CPT

## 2022-07-13 PROCEDURE — 85610 PROTHROMBIN TIME: CPT

## 2022-07-13 PROCEDURE — 81001 URINALYSIS AUTO W/SCOPE: CPT

## 2022-07-13 PROCEDURE — 71046 X-RAY EXAM CHEST 2 VIEWS: CPT

## 2022-07-13 PROCEDURE — 99214 OFFICE O/P EST MOD 30 MIN: CPT | Mod: 25

## 2022-07-13 PROCEDURE — 71046 X-RAY EXAM CHEST 2 VIEWS: CPT | Mod: 26

## 2022-07-13 RX ORDER — LOSARTAN POTASSIUM 100 MG/1
0 TABLET, FILM COATED ORAL
Qty: 0 | Refills: 0 | DISCHARGE

## 2022-07-13 RX ORDER — LEVALBUTEROL 1.25 MG/.5ML
0 SOLUTION, CONCENTRATE RESPIRATORY (INHALATION)
Qty: 0 | Refills: 0 | DISCHARGE

## 2022-07-13 RX ORDER — ESCITALOPRAM OXALATE 10 MG/1
2 TABLET, FILM COATED ORAL
Qty: 0 | Refills: 0 | DISCHARGE

## 2022-07-13 NOTE — H&P PST ADULT - NSICDXFAMILYHX_GEN_ALL_CORE_FT
FAMILY HISTORY:  FHx: heart disease    Grandparent  Still living? No  Family history of stomach cancer, Age at diagnosis: 51-60

## 2022-07-13 NOTE — H&P PST ADULT - NSICDXPASTMEDICALHX_GEN_ALL_CORE_FT
PAST MEDICAL HISTORY:  Asthma     Bulging lumbar disc     Chronic urticaria     Difficulty urinating on tamsulosin    Gastroesophageal reflux disease without esophagitis     Hemorrhoids     Hypertension     Low back pain     Morbid obesity     Rectal pain     Sarcoidosis     Sleep apnea uses CPAP machine

## 2022-07-13 NOTE — H&P PST ADULT - PROBLEM SELECTOR PLAN 1
Pre op and chlorhexidine instructions given and explained.  Avoid NSAIDs and OTC supplements.   Patient verbalized understanding  medical consult requested by surgeon 7/13/22  covid 19 swab scheduled 7/15/22, advised to quarantine after test

## 2022-07-13 NOTE — H&P PST ADULT - HISTORY OF PRESENT ILLNESS
31 y/o male presents to [facility] for scheduled gastric bypass on 7/18/22 for weight loss.

## 2022-07-13 NOTE — H&P PST ADULT - ASSESSMENT
33 y/o male presents to [facility] for scheduled gastric bypass on 22 for weight loss.       CAPRINI SCORE    AGE RELATED RISK FACTORS                                                       MOBILITY RELATED FACTORS  [ ] Age 41-60 years                                            (1 Point)                  [ ] Bed rest                                                        (1 Point)  [ ] Age: 61-74 years                                           (2 Points)                [ ] Plaster cast                                                   (2 Points)  [ ] Age= 75 years                                              (3 Points)                 [ ] Bed bound for more than 72 hours                   (2 Points)    DISEASE RELATED RISK FACTORS                                               GENDER SPECIFIC FACTORS  [ ] Edema in the lower extremities                       (1 Point)                  [ ] Pregnancy                                                     (1 Point)  [ ] Varicose veins                                               (1 Point)                  [ ] Post-partum < 6 weeks                                   (1 Point)             [x ] BMI > 25 Kg/m2                                            (1 Point)                  [ ] Hormonal therapy  or oral contraception            (1 Point)                 [ ] Sepsis (in the previous month)                        (1 Point)                  [ ] History of pregnancy complications  [ ] Pneumonia or serious lung disease                                               [ ] Unexplained or recurrent                       (1 Point)           (in the previous month)                               (1 Point)  [ ] Abnormal pulmonary function test                     (1 Point)                 SURGERY RELATED RISK FACTORS  [ ] Acute myocardial infarction                              (1 Point)                 [ ]  Section                                            (1 Point)  [ ] Congestive heart failure (in the previous month)  (1 Point)                 [ ] Minor surgery                                                 (1 Point)   [ ] Inflammatory bowel disease                             (1 Point)                 [ ] Arthroscopic surgery                                        (2 Points)  [ ] Central venous access                                    (2 Points)                [x ] General surgery lasting more than 45 minutes   (2 Points)       [ ] Stroke (in the previous month)                          (5 Points)               [ ] Elective arthroplasty                                        (5 Points)            [ ] malignancy                                                             (2 points)                                                                                                                                 HEMATOLOGY RELATED FACTORS                                                 TRAUMA RELATED RISK FACTORS  [ ] Prior episodes of VTE                                     (3 Points)                 [ ] Fracture of the hip, pelvis, or leg                       (5 Points)  [ ] Positive family history for VTE                         (3 Points)                 [ ] Acute spinal cord injury (in the previous month)  (5 Points)  [ ] Prothrombin 85583 A                                      (3 Points)                 [ ] Paralysis  (less than 1 month)                          (5 Points)  [ ] Factor V Leiden                                             (3 Points)                 [ ] Multiple Trauma within 1 month                         (5 Points)  [ ] Lupus anticoagulants                                     (3 Points)                                                           [ ] Anticardiolipin antibodies                                (3 Points)                                                       [ ] High homocysteine in the blood                      (3 Points)                                             [ ] Other congenital or acquired thrombophilia       (3 Points)                                                [ ] Heparin induced thrombocytopenia                  (3 Points)                                          Total Score [  4        ]    The Caprini score indicates this patient is at risk for a VTE event (score 3-5).  Most surgical patients in this group would benefit from pharmacologic prophylaxis.  The surgical team will determine the balance between VTE risk and bleeding risk

## 2022-07-13 NOTE — H&P PST ADULT - NSICDXPASTSURGICALHX_GEN_ALL_CORE_FT
PAST SURGICAL HISTORY:  H/O colonoscopy     Hemorrhoid hemorrhoid banding 2018    History of esophagogastroduodenoscopy (EGD)     History of lung biopsy     S/P nasal surgery fracture

## 2022-07-14 DIAGNOSIS — E66.01 MORBID (SEVERE) OBESITY DUE TO EXCESS CALORIES: ICD-10-CM

## 2022-07-14 DIAGNOSIS — Z01.818 ENCOUNTER FOR OTHER PREPROCEDURAL EXAMINATION: ICD-10-CM

## 2022-07-18 ENCOUNTER — INPATIENT (INPATIENT)
Facility: HOSPITAL | Age: 32
LOS: 0 days | Discharge: HOME | DRG: 621 | End: 2022-07-19
Attending: SURGERY | Admitting: SURGERY
Payer: COMMERCIAL

## 2022-07-18 VITALS
WEIGHT: 315 LBS | OXYGEN SATURATION: 98 % | HEIGHT: 68 IN | HEART RATE: 92 BPM | SYSTOLIC BLOOD PRESSURE: 143 MMHG | DIASTOLIC BLOOD PRESSURE: 86 MMHG | RESPIRATION RATE: 16 BRPM | TEMPERATURE: 98 F

## 2022-07-18 DIAGNOSIS — Z98.890 OTHER SPECIFIED POSTPROCEDURAL STATES: Chronic | ICD-10-CM

## 2022-07-18 DIAGNOSIS — K64.9 UNSPECIFIED HEMORRHOIDS: Chronic | ICD-10-CM

## 2022-07-18 DIAGNOSIS — E66.01 MORBID (SEVERE) OBESITY DUE TO EXCESS CALORIES: ICD-10-CM

## 2022-07-18 DIAGNOSIS — Z98.89 OTHER SPECIFIED POSTPROCEDURAL STATES: Chronic | ICD-10-CM

## 2022-07-18 PROCEDURE — C9399: CPT

## 2022-07-18 PROCEDURE — 82962 GLUCOSE BLOOD TEST: CPT

## 2022-07-18 PROCEDURE — 36415 COLL VENOUS BLD VENIPUNCTURE: CPT

## 2022-07-18 PROCEDURE — C1889: CPT

## 2022-07-18 PROCEDURE — 85025 COMPLETE CBC W/AUTO DIFF WBC: CPT

## 2022-07-18 PROCEDURE — 80048 BASIC METABOLIC PNL TOTAL CA: CPT

## 2022-07-18 PROCEDURE — 99221 1ST HOSP IP/OBS SF/LOW 40: CPT

## 2022-07-18 PROCEDURE — C9113: CPT

## 2022-07-18 RX ORDER — ACETAMINOPHEN 500 MG
1000 TABLET ORAL ONCE
Refills: 0 | Status: DISCONTINUED | OUTPATIENT
Start: 2022-07-18 | End: 2022-07-19

## 2022-07-18 RX ORDER — OMEPRAZOLE 10 MG/1
1 CAPSULE, DELAYED RELEASE ORAL
Qty: 0 | Refills: 0 | DISCHARGE

## 2022-07-18 RX ORDER — OXYCODONE HYDROCHLORIDE 5 MG/1
5 TABLET ORAL EVERY 4 HOURS
Refills: 0 | Status: DISCONTINUED | OUTPATIENT
Start: 2022-07-18 | End: 2022-07-19

## 2022-07-18 RX ORDER — ONDANSETRON 8 MG/1
4 TABLET, FILM COATED ORAL ONCE
Refills: 0 | Status: COMPLETED | OUTPATIENT
Start: 2022-07-18 | End: 2022-07-18

## 2022-07-18 RX ORDER — OXYCODONE HYDROCHLORIDE 5 MG/1
10 TABLET ORAL EVERY 4 HOURS
Refills: 0 | Status: DISCONTINUED | OUTPATIENT
Start: 2022-07-18 | End: 2022-07-19

## 2022-07-18 RX ORDER — KETOROLAC TROMETHAMINE 30 MG/ML
30 SYRINGE (ML) INJECTION EVERY 6 HOURS
Refills: 0 | Status: COMPLETED | OUTPATIENT
Start: 2022-07-18 | End: 2022-07-19

## 2022-07-18 RX ORDER — SODIUM CHLORIDE 9 MG/ML
1000 INJECTION, SOLUTION INTRAVENOUS
Refills: 0 | Status: DISCONTINUED | OUTPATIENT
Start: 2022-07-18 | End: 2022-07-18

## 2022-07-18 RX ORDER — HYDROMORPHONE HYDROCHLORIDE 2 MG/ML
0.5 INJECTION INTRAMUSCULAR; INTRAVENOUS; SUBCUTANEOUS
Refills: 0 | Status: DISCONTINUED | OUTPATIENT
Start: 2022-07-18 | End: 2022-07-18

## 2022-07-18 RX ORDER — APREPITANT 80 MG/1
80 CAPSULE ORAL ONCE
Refills: 0 | Status: COMPLETED | OUTPATIENT
Start: 2022-07-18 | End: 2022-07-18

## 2022-07-18 RX ORDER — SODIUM CHLORIDE 9 MG/ML
1000 INJECTION, SOLUTION INTRAVENOUS
Refills: 0 | Status: DISCONTINUED | OUTPATIENT
Start: 2022-07-18 | End: 2022-07-19

## 2022-07-18 RX ORDER — ESCITALOPRAM OXALATE 10 MG/1
1 TABLET, FILM COATED ORAL
Qty: 0 | Refills: 0 | DISCHARGE

## 2022-07-18 RX ORDER — PROCHLORPERAZINE MALEATE 5 MG
10 TABLET ORAL ONCE
Refills: 0 | Status: COMPLETED | OUTPATIENT
Start: 2022-07-18 | End: 2022-07-18

## 2022-07-18 RX ORDER — PANTOPRAZOLE SODIUM 20 MG/1
40 TABLET, DELAYED RELEASE ORAL EVERY 24 HOURS
Refills: 0 | Status: DISCONTINUED | OUTPATIENT
Start: 2022-07-18 | End: 2022-07-19

## 2022-07-18 RX ORDER — ACETAMINOPHEN 500 MG
1000 TABLET ORAL ONCE
Refills: 0 | Status: COMPLETED | OUTPATIENT
Start: 2022-07-18 | End: 2022-07-18

## 2022-07-18 RX ORDER — ENOXAPARIN SODIUM 100 MG/ML
40 INJECTION SUBCUTANEOUS EVERY 12 HOURS
Refills: 0 | Status: DISCONTINUED | OUTPATIENT
Start: 2022-07-18 | End: 2022-07-19

## 2022-07-18 RX ORDER — MULTIVIT-MIN/FERROUS GLUCONATE 9 MG/15 ML
1 LIQUID (ML) ORAL
Qty: 0 | Refills: 0 | DISCHARGE

## 2022-07-18 RX ORDER — FEXOFENADINE HCL 30 MG
1 TABLET ORAL
Qty: 0 | Refills: 0 | DISCHARGE

## 2022-07-18 RX ORDER — OMEGA-3 ACID ETHYL ESTERS 1 G
0 CAPSULE ORAL
Qty: 0 | Refills: 0 | DISCHARGE

## 2022-07-18 RX ORDER — HEPARIN SODIUM 5000 [USP'U]/ML
5000 INJECTION INTRAVENOUS; SUBCUTANEOUS ONCE
Refills: 0 | Status: COMPLETED | OUTPATIENT
Start: 2022-07-18 | End: 2022-07-18

## 2022-07-18 RX ORDER — ONDANSETRON 8 MG/1
4 TABLET, FILM COATED ORAL EVERY 6 HOURS
Refills: 0 | Status: DISCONTINUED | OUTPATIENT
Start: 2022-07-18 | End: 2022-07-19

## 2022-07-18 RX ORDER — LEVALBUTEROL 1.25 MG/.5ML
2 SOLUTION, CONCENTRATE RESPIRATORY (INHALATION)
Qty: 0 | Refills: 0 | DISCHARGE

## 2022-07-18 RX ADMIN — HEPARIN SODIUM 5000 UNIT(S): 5000 INJECTION INTRAVENOUS; SUBCUTANEOUS at 10:55

## 2022-07-18 RX ADMIN — Medication 400 MILLIGRAM(S): at 17:53

## 2022-07-18 RX ADMIN — Medication 30 MILLIGRAM(S): at 20:06

## 2022-07-18 RX ADMIN — Medication 10 MILLIGRAM(S): at 15:11

## 2022-07-18 RX ADMIN — ONDANSETRON 4 MILLIGRAM(S): 8 TABLET, FILM COATED ORAL at 18:38

## 2022-07-18 RX ADMIN — Medication 400 MILLIGRAM(S): at 20:06

## 2022-07-18 RX ADMIN — ENOXAPARIN SODIUM 40 MILLIGRAM(S): 100 INJECTION SUBCUTANEOUS at 21:38

## 2022-07-18 RX ADMIN — SODIUM CHLORIDE 200 MILLILITER(S): 9 INJECTION, SOLUTION INTRAVENOUS at 14:45

## 2022-07-18 RX ADMIN — ONDANSETRON 4 MILLIGRAM(S): 8 TABLET, FILM COATED ORAL at 14:57

## 2022-07-18 RX ADMIN — Medication 1000 MILLIGRAM(S): at 18:33

## 2022-07-18 RX ADMIN — APREPITANT 80 MILLIGRAM(S): 80 CAPSULE ORAL at 10:55

## 2022-07-18 RX ADMIN — PANTOPRAZOLE SODIUM 40 MILLIGRAM(S): 20 TABLET, DELAYED RELEASE ORAL at 14:45

## 2022-07-18 RX ADMIN — HYDROMORPHONE HYDROCHLORIDE 0.5 MILLIGRAM(S): 2 INJECTION INTRAMUSCULAR; INTRAVENOUS; SUBCUTANEOUS at 15:14

## 2022-07-18 RX ADMIN — Medication 1000 MILLIGRAM(S): at 20:06

## 2022-07-18 NOTE — ASU PREOP CHECKLIST - SPO2 (%)
98 Cheek Interpolation Flap Text: A decision was made to reconstruct the defect utilizing an interpolation axial flap and a staged reconstruction.  A telfa template was made of the defect.  This telfa template was then used to outline the Cheek Interpolation flap.  The donor area for the pedicle flap was then injected with anesthesia.  The flap was excised through the skin and subcutaneous tissue down to the layer of the underlying musculature.  The interpolation flap was carefully excised within this deep plane to maintain its blood supply.  The edges of the donor site were undermined.   The donor site was closed in a primary fashion.  The pedicle was then rotated into position and sutured.  Once the tube was sutured into place, adequate blood supply was confirmed with blanching and refill.  The pedicle was then wrapped with xeroform gauze and dressed appropriately with a telfa and gauze bandage to ensure continued blood supply and protect the attached pedicle.

## 2022-07-18 NOTE — PATIENT PROFILE ADULT - NSPROPTRIGHTCAREGIVER_GEN_A_NUR
Occupational Therapy Daily Note    Visit Count: 17  Plan of Care Dates: Initial: 3/14/2018 Through: 7/4/2018  Insurance Information:   THERAPY BENEFITS:  PAYOR: [facility]  VISIT LIMIT: 20V LIMIT   PER YEAR  AUTHORIZATION NEEDED: NO  NOTES:     Next Referring Provider Visit: 7/30/18    Referred by: Lora Bui PA-C  Medical Diagnosis (from order):    RT shoulder Revision reconstruction, biceps tenodesis 3/8/18 [M24.411]         Treatment Diagnosis: Shoulder Symptoms with Impaired Joint Mobility, Impaired Range of Motion and Impaired Motor Function/Muscle Performance  Insurance: 1. [employer]  2.     Date of onset/injury: Spring, 2017  Date of Surgery: 3/8/18  Surgery performed:   PROCEDURE:  1.  Right shoulder arthroscopy with revision anterior capsulolabral reconstruction.  2.  Subpectoral biceps tenodesis.Physician Guidelines: yes  Diagnosis Precautions: none    SUBJECTIVE   Description of Problem/Mechanism of Injury: Re-injured his right shoulder last spring while playing basketball. Patient has a history of right shoulder dislocations followed by an Arthroscopic Capsulolabral Reconstruction on 2/19/15.    Pain:  Intensity: Now: 0/10; Best: 0/10; Worst: 0/10 (in the last 2 weeks)  Location: Some discomfort noted in the scapular region.     Function:  Limitations and exacerbating factors (patient reported): pain, difficulty, increased time to complete with all activities/tasks with involved extremity  Prior level (patient reported): independent with all activities of daily living and instrumental activities of daily living    Patient Goals/Concerns:  To improve the mobility of the right arm per protocol.     OBJECTIVE   Hand Dominance: right    Posture/Observation:  Wearing the sling.    Range of Motion (degrees)   Norm Right Right Right Right Right Right   Shoulder                    Date  3/28/18 4/12/18 4/17/18 4/23/18 5/1/1 8 5/8/18   Flexion 170-180 140 145 150 160 165 170   Internal Rotation    60 at 45 degrees of abduction 65 at 45 degrees of abduction 65 degrees at 90 degrees of abduction 75 degrees at 90 degrees of abduction 80 degrees at 90 degrees of abduction 80 degrees at 90 degrees of abduction   External  Rotation 80-90 30 at 45 degrees of abduction 50 degrees at 45 degrees of abduction 60 degrees at 90  degrees of abduction 60 degrees at 90 degrees of abduction 70 degrees at 90 degrees abduction  80 degrees at 90 degrees of abduction   standard testing positions unless otherwise noted; Key: ranges are reported in active range of motion unless noted as AA=active assistive or P=passive range of motion, * denotes pain   Comments: Only those motions that were assessed are noted.    Strength: Deferred secondary to the precautions related to surgery.    Outcome Measures:   Disabilities of the Arm, Shoulder and Hand (DASH): 80.17 (scored 0-100; a higher score indicates greater disability)     Treatment Performed Today:     Exercise   Sitting:   Reciprocal Pulley Exercises   Supine:   Shoulder Flexion with a Cane    External Rotation with a cane in 90 degrees of abduction    Internal Rotation with the arm in 90 degrees of abduction   3# Punches to the Ceiling 1 Set of 10   3# Alphabet   3#  Diagonal Patterns D-1, D-2 1 Set of 10   Prone:   4# Prone Rows at 30 degrees 1 Set of 10   4# Shoulder Extension 1 Set of 10   2# Horizontal Abduction 1 Set of 10   Sidelying:   3#, External Rotation   Sets of 10 each   Standing:   Scapular Wall Slides 1 Set of 10   Rolling a Ball on the Wall for Scapular Stabilization Clockwise and Counterclockwise   Green Theraband Rowing 1 Set of 10 *   Blue Theraband External Rotation 1 Set of 10 *   Blue Theraband Internal Rotation 1 Set of 10 *   5# Elbow Flexion/Extension 1 Set of 10 *   5# Pronation/Supination 1 Set of 10 *   Oscillation Therapy with the Body Blade x   Upper Body Ergometer Level 3 3 minutes forwards 3 minutes backwards   Patient was issued  yellow/red/green/blue theraband for his home exercise program. *   Comments:     Initial Home Program:  * above denotes home program issuance as well    Plan for next session: Progress per the Capsule Labral Reconstruction and Biceps Tenodesis Physician Guidelines..    ASSESSMENT   Patient's strength is progressing, but he requires reminders to avoid over doing.     Goals:  To be obtained by end of this plan of care:  1. Patient independent with modified and progressed home exercise program.  2. Patient will decrease involved shoulder pain/symptoms to 2/10  to aid in tolerating repetitive overhead/upper extremity activity, reaching behind back for independent living tasks.   3. Patient will increase involved shoulder active range of motion to Within Functional Limits° to aid in tolerating repetitive overhead/upper extremity activity, reaching behind back for independent living tasks.   4. Patient will increase involved shoulder strength to 4/5 to aid in tolerating repetitive overhead/upper extremity activity, reaching behind back for independent living tasks.  5. Patient will be able to reach behind back with minimal pain/difficulty to improve function in dressing.   6. Patient will be able to reach over head with minimal pain/difficulty to improve function in cooking, reaching into cupboard, grooming.    PLAN   Continue occupational therapy to progress per protocol.     THERAPY DAILY BILLING   Primary Insurance: [facility]  Secondary Insurance:     Evaluation Procedures:  No evaluation codes were used on this date of service    Timed Procedures:  Neuromuscular Re-Education, 15 minutes  Therapeutic Exercise, 30 minutes    Untimed Procedures:  No untimed codes were used on this date of service    Total Treatment Time: 45 minutes     NATA Casas, CHT, CWcHP    yes

## 2022-07-18 NOTE — BRIEF OPERATIVE NOTE - OPERATION/FINDINGS
Patient underwent laparoscopic RNY gastric bypass (Amando 150cm & BP  150cm) without any complications. Intraoperative EGD confirmed intact GJ anastomosis that is easily transversed with negative leak test.

## 2022-07-18 NOTE — CONSULT NOTE ADULT - SUBJECTIVE AND OBJECTIVE BOX
PCP:  Dr. Hernandez    CHIEF COMPLAINT:  Morbid Obesity    HISTORY OF THE PRESENT ILLNESS: This a 35 yo male with PMH  : asthma, chronic urticaria, urinary hesitancy, GERD, HTN, chronic back pain, CONSTANTINE, uses CPAP, Pulmonary Sarcoidosis, with chronic sob/PHAM and morbid obesity, with BMI 53 who failed diet, exercise and usual modalities for weight loss and is now S/P Lap Gastric bypass.  Patient is seen in PACU, in [facility], S,  currently sedated. We are consulted for Medical management    PAST MEDICAL HISTORY: as above    PAST SURGICAL HISTORY: colonoscopy, hemorrhoidectomy lung bx    FAMILY HISTORY:    Mother; CAD with stents, DM, asthma    SOCIAL HISTORY: former smoker x 5 years quit in 2020,  no alcohol, no drugs    ALLERGIES: demerol    HOME MEDS: see med rec    REVIEW OF SYSTEMS:   unable to assess 2/2 sedation    MEDICATIONS  (STANDING):  lactated ringers. 1000 milliLiter(s) (75 mL/Hr) IV Continuous <Continuous>  lactated ringers. 1000 milliLiter(s) (200 mL/Hr) IV Continuous <Continuous>  pantoprazole  Injectable 40 milliGRAM(s) IV Push every 24 hours    MEDICATIONS  (PRN):  HYDROmorphone  Injectable 0.5 milliGRAM(s) IV Push every 10 minutes PRN Moderate Pain (4 - 6)      VITALS:  T(F): 98.2 (07-18-22 @ 14:35), Max: 98.2 (07-18-22 @ 14:35)  HR: 87 (07-18-22 @ 15:15) (81 - 96)  BP: 133/80 (07-18-22 @ 15:15) (120/75 - 148/73)  RR: 20 (07-18-22 @ 15:15) (16 - 20)  SpO2: 97% (07-18-22 @ 15:15) (96% - 98%)  Wt(kg): --    I&O's Summary    18 Jul 2022 07:01  -  18 Jul 2022 15:38  --------------------------------------------------------  IN: 2000 mL / OUT: 0 mL / NET: 2000 mL        CAPILLARY BLOOD GLUCOSE      POCT Blood Glucose.: 136 mg/dL (18 Jul 2022 14:44)    PHYSICAL EXAM:    GENERAL: Comfortable, no acute distress   HEAD:  Normocephalic, atraumatic  EYES: EOMI, PERRLA  HEENT: Moist mucous membranes  NECK: Supple, No JVD  NERVOUS SYSTEM:  sedated  CHEST/LUNG: Clear to auscultation bilaterally  HEART: Regular rate and rhythm  ABDOMEN: Soft, non tender, Nondistended, Bowel sounds present  GENITOURINARY: Voiding, no palpable bladder  EXTREMITIES:   No clubbing, cyanosis, or edema  MUSCULOSKELETAL- No muscle tenderness, no joint tenderness  SKIN-no rash      LABS: pending        IMPRESSION: 35 yo male with above pmh a/w:    #Morbid Obesity, BMI 53  # S/P lap gastric bypass  POD#0  pain control  IVF's  cl liqs per bariatric protocol  Monitor CBC/BMP  BGMs with SS  protonix IVP    #asthma, controlled/pulm sarcoidosis  albuterol prn    #HTN  vasotec prn    # CONSTANTINE  monitor O2 sats  supplement with O2 NC prn    #GERD  PPI IV    #VTE prophylaxis  Lovenox  venodynes BLE  AMB    Thank you for the consult, will follow with you               PCP:  Dr. Hernandez    CHIEF COMPLAINT:  Morbid Obesity    HISTORY OF THE PRESENT ILLNESS: This a 35 yo male with PMH  : asthma, chronic urticaria, urinary hesitancy, GERD, HTN, chronic back pain, CONSTANTINE, uses CPAP, Pulmonary Sarcoidosis, with chronic sob/PHAM and morbid obesity, with BMI 53 who failed diet, exercise and usual modalities for weight loss and is now S/P Lap Gastric bypass.  Patient is seen in PACU, in [facility], S,  currently sedated. We are consulted for Medical management    PAST MEDICAL HISTORY: as above    PAST SURGICAL HISTORY: colonoscopy, hemorrhoidectomy lung bx    FAMILY HISTORY:    Mother; CAD with stents, DM, asthma    SOCIAL HISTORY: former smoker x 5 years quit in 2020,  no alcohol, no drugs    ALLERGIES: demerol    HOME MEDS: see med rec    REVIEW OF SYSTEMS:   unable to assess 2/2 sedation    MEDICATIONS  (STANDING):  lactated ringers. 1000 milliLiter(s) (75 mL/Hr) IV Continuous <Continuous>  lactated ringers. 1000 milliLiter(s) (200 mL/Hr) IV Continuous <Continuous>  pantoprazole  Injectable 40 milliGRAM(s) IV Push every 24 hours    MEDICATIONS  (PRN):  HYDROmorphone  Injectable 0.5 milliGRAM(s) IV Push every 10 minutes PRN Moderate Pain (4 - 6)      VITALS:  T(F): 98.2 (07-18-22 @ 14:35), Max: 98.2 (07-18-22 @ 14:35)  HR: 87 (07-18-22 @ 15:15) (81 - 96)  BP: 133/80 (07-18-22 @ 15:15) (120/75 - 148/73)  RR: 20 (07-18-22 @ 15:15) (16 - 20)  SpO2: 97% (07-18-22 @ 15:15) (96% - 98%)        POCT Blood Glucose.: 136 mg/dL (18 Jul 2022 14:44)    PHYSICAL EXAM:    GENERAL: Comfortable, no acute distress   HEAD:  Normocephalic, atraumatic  EYES: EOMI, PERRLA  HEENT: Moist mucous membranes  NECK: Supple, No JVD  NERVOUS SYSTEM:  sedated  CHEST/LUNG: Clear to auscultation bilaterally  HEART: Regular rate and rhythm  ABDOMEN: Soft, non tender, Nondistended, Bowel sounds present  GENITOURINARY: Voiding, no palpable bladder  EXTREMITIES:   No clubbing, cyanosis, or edema  MUSCULOSKELETAL- No muscle tenderness, no joint tenderness  SKIN-no rash      LABS: pending        IMPRESSION: 35 yo male with above pmh a/w:    #Morbid Obesity, BMI 53  # S/P lap gastric bypass  POD#0  pain control  IVF's  cl liqs per bariatric protocol  Monitor CBC/BMP  BGMs with SS  protonix IVP    #asthma, controlled/pulm sarcoidosis  albuterol prn    #HTN  vasotec prn    # CONSTANTINE  monitor O2 sats  supplement with O2 NC prn    #GERD  PPI IV    #VTE prophylaxis  Lovenox  venodynes BLE  AMB    Thank you for the consult, will follow with you

## 2022-07-18 NOTE — CONSULT NOTE ADULT - NS ATTEND AMEND GEN_ALL_CORE FT
Pt seen and examined. D/w NP Liliana cooley. Agree with documentation as above with changes made where appropriate.   34m PMH Of asthma, HTN, GERD, CONSTANTINE on cpap, Pulmonary sarcoid, morbid obesity who is admitted for gastric bypass.   -ivf  -ppi  -incentive spirometry  -ambulate when feasible.   -barbara clears per protocol  -tylenol iv for pain  -albuterol prn for asthma  -iv vasotec for HTN.  -pt has own cpap  -dvt px    thanks, will follow.

## 2022-07-18 NOTE — PATIENT PROFILE ADULT - FALL HARM RISK - UNIVERSAL INTERVENTIONS
Bed in lowest position, wheels locked, appropriate side rails in place/Call bell, personal items and telephone in reach/Instruct patient to call for assistance before getting out of bed or chair/Non-slip footwear when patient is out of bed/[place] to call system/Physically safe environment - no spills, clutter or unnecessary equipment/Purposeful Proactive Rounding/Room/bathroom lighting operational, light cord in reach

## 2022-07-18 NOTE — BRIEF OPERATIVE NOTE - NSICDXBRIEFPROCEDURE_GEN_ALL_CORE_FT
PROCEDURES:  Laparoscopic gastric bypass 18-Jul-2022 14:39:08  Lela Holman  Block, transversus abdominis plane, bilateral 18-Jul-2022 14:39:12  Lela Holman  EGD, intraoperative 18-Jul-2022 14:39:16  Lela Holman

## 2022-07-19 ENCOUNTER — TRANSCRIPTION ENCOUNTER (OUTPATIENT)
Age: 32
End: 2022-07-19

## 2022-07-19 VITALS
RESPIRATION RATE: 18 BRPM | DIASTOLIC BLOOD PRESSURE: 69 MMHG | SYSTOLIC BLOOD PRESSURE: 119 MMHG | OXYGEN SATURATION: 98 % | TEMPERATURE: 98 F | HEART RATE: 85 BPM

## 2022-07-19 DIAGNOSIS — E66.01 MORBID (SEVERE) OBESITY DUE TO EXCESS CALORIES: ICD-10-CM

## 2022-07-19 LAB
ANION GAP SERPL CALC-SCNC: 5 MMOL/L — SIGNIFICANT CHANGE UP (ref 5–17)
BASOPHILS # BLD AUTO: 0.01 K/UL — SIGNIFICANT CHANGE UP (ref 0–0.2)
BASOPHILS NFR BLD AUTO: 0.1 % — SIGNIFICANT CHANGE UP (ref 0–2)
BUN SERPL-MCNC: 8 MG/DL — SIGNIFICANT CHANGE UP (ref 7–23)
CALCIUM SERPL-MCNC: 8.5 MG/DL — SIGNIFICANT CHANGE UP (ref 8.5–10.1)
CHLORIDE SERPL-SCNC: 108 MMOL/L — SIGNIFICANT CHANGE UP (ref 96–108)
CO2 SERPL-SCNC: 24 MMOL/L — SIGNIFICANT CHANGE UP (ref 22–31)
CREAT SERPL-MCNC: 0.87 MG/DL — SIGNIFICANT CHANGE UP (ref 0.5–1.3)
EGFR: 118 ML/MIN/1.73M2 — SIGNIFICANT CHANGE UP
EOSINOPHIL # BLD AUTO: 0 K/UL — SIGNIFICANT CHANGE UP (ref 0–0.5)
EOSINOPHIL NFR BLD AUTO: 0 % — SIGNIFICANT CHANGE UP (ref 0–6)
GLUCOSE SERPL-MCNC: 114 MG/DL — HIGH (ref 70–99)
HCT VFR BLD CALC: 41.8 % — SIGNIFICANT CHANGE UP (ref 39–50)
HGB BLD-MCNC: 13.8 G/DL — SIGNIFICANT CHANGE UP (ref 13–17)
IMM GRANULOCYTES NFR BLD AUTO: 0.4 % — SIGNIFICANT CHANGE UP (ref 0–1.5)
LYMPHOCYTES # BLD AUTO: 1.01 K/UL — SIGNIFICANT CHANGE UP (ref 1–3.3)
LYMPHOCYTES # BLD AUTO: 7.6 % — LOW (ref 13–44)
MCHC RBC-ENTMCNC: 24.3 PG — LOW (ref 27–34)
MCHC RBC-ENTMCNC: 33 GM/DL — SIGNIFICANT CHANGE UP (ref 32–36)
MCV RBC AUTO: 73.5 FL — LOW (ref 80–100)
MONOCYTES # BLD AUTO: 0.92 K/UL — HIGH (ref 0–0.9)
MONOCYTES NFR BLD AUTO: 6.9 % — SIGNIFICANT CHANGE UP (ref 2–14)
NEUTROPHILS # BLD AUTO: 11.28 K/UL — HIGH (ref 1.8–7.4)
NEUTROPHILS NFR BLD AUTO: 85 % — HIGH (ref 43–77)
PLATELET # BLD AUTO: 240 K/UL — SIGNIFICANT CHANGE UP (ref 150–400)
POTASSIUM SERPL-MCNC: 3.8 MMOL/L — SIGNIFICANT CHANGE UP (ref 3.5–5.3)
POTASSIUM SERPL-SCNC: 3.8 MMOL/L — SIGNIFICANT CHANGE UP (ref 3.5–5.3)
RBC # BLD: 5.69 M/UL — SIGNIFICANT CHANGE UP (ref 4.2–5.8)
RBC # FLD: 14.5 % — SIGNIFICANT CHANGE UP (ref 10.3–14.5)
SODIUM SERPL-SCNC: 137 MMOL/L — SIGNIFICANT CHANGE UP (ref 135–145)
WBC # BLD: 13.27 K/UL — HIGH (ref 3.8–10.5)
WBC # FLD AUTO: 13.27 K/UL — HIGH (ref 3.8–10.5)

## 2022-07-19 PROCEDURE — 99232 SBSQ HOSP IP/OBS MODERATE 35: CPT

## 2022-07-19 RX ORDER — ACETAMINOPHEN 500 MG
1000 TABLET ORAL ONCE
Refills: 0 | Status: COMPLETED | OUTPATIENT
Start: 2022-07-19 | End: 2022-07-19

## 2022-07-19 RX ORDER — ACETAZOLAMIDE 250 MG/1
1 TABLET ORAL
Qty: 0 | Refills: 0 | DISCHARGE

## 2022-07-19 RX ORDER — LOSARTAN POTASSIUM 100 MG/1
1 TABLET, FILM COATED ORAL
Qty: 0 | Refills: 0 | DISCHARGE

## 2022-07-19 RX ADMIN — OXYCODONE HYDROCHLORIDE 5 MILLIGRAM(S): 5 TABLET ORAL at 00:19

## 2022-07-19 RX ADMIN — ONDANSETRON 4 MILLIGRAM(S): 8 TABLET, FILM COATED ORAL at 05:22

## 2022-07-19 RX ADMIN — Medication 1000 MILLIGRAM(S): at 08:30

## 2022-07-19 RX ADMIN — Medication 30 MILLIGRAM(S): at 02:07

## 2022-07-19 RX ADMIN — ONDANSETRON 4 MILLIGRAM(S): 8 TABLET, FILM COATED ORAL at 11:24

## 2022-07-19 RX ADMIN — Medication 1000 MILLIGRAM(S): at 02:07

## 2022-07-19 RX ADMIN — OXYCODONE HYDROCHLORIDE 5 MILLIGRAM(S): 5 TABLET ORAL at 06:07

## 2022-07-19 RX ADMIN — Medication 2.5 MILLIGRAM(S): at 05:22

## 2022-07-19 RX ADMIN — OXYCODONE HYDROCHLORIDE 5 MILLIGRAM(S): 5 TABLET ORAL at 01:12

## 2022-07-19 RX ADMIN — ONDANSETRON 4 MILLIGRAM(S): 8 TABLET, FILM COATED ORAL at 00:08

## 2022-07-19 RX ADMIN — Medication 30 MILLIGRAM(S): at 08:30

## 2022-07-19 RX ADMIN — Medication 400 MILLIGRAM(S): at 02:07

## 2022-07-19 RX ADMIN — Medication 2.5 MILLIGRAM(S): at 00:09

## 2022-07-19 RX ADMIN — Medication 400 MILLIGRAM(S): at 08:30

## 2022-07-19 RX ADMIN — SODIUM CHLORIDE 200 MILLILITER(S): 9 INJECTION, SOLUTION INTRAVENOUS at 06:07

## 2022-07-19 RX ADMIN — ENOXAPARIN SODIUM 40 MILLIGRAM(S): 100 INJECTION SUBCUTANEOUS at 11:23

## 2022-07-19 NOTE — PROGRESS NOTE ADULT - SUBJECTIVE AND OBJECTIVE BOX
PCP:  Dr. Hernandez    CHIEF COMPLAINT:  Morbid Obesity    HPI: 34M, pmh of asthma, chronic urticaria, urinary hesitancy, GERD, HTN, chronic back pain, CONSTANTINE, uses CPAP, Pulmonary Sarcoidosis, with chronic sob/PHAM and morbid obesity, with BMI 53 who failed diet, exercise and usual modalities for weight loss and is now S/P Lap Gastric bypass.  Hospitalist service consulted for medical comanagement.   pt seen and examined this am. doing well. Minimal pain. ambulated multiple times. no wheezing. no difficulty voiding.    ROS: all 10 systems reviewed and is as above otherwise negative.     Vital Signs Last 24 Hrs  T(C): 36.4 (19 Jul 2022 08:40), Max: 36.9 (18 Jul 2022 17:00)  T(F): 97.5 (19 Jul 2022 08:40), Max: 98.5 (18 Jul 2022 18:07)  HR: 85 (19 Jul 2022 08:40) (70 - 96)  BP: 119/69 (19 Jul 2022 08:40) (117/65 - 152/77)  BP(mean): 95 (18 Jul 2022 20:24) (79 - 95)  RR: 18 (19 Jul 2022 08:40) (16 - 20)  SpO2: 98% (19 Jul 2022 08:40) (96% - 99%)    Parameters below as of 19 Jul 2022 08:40  Patient On (Oxygen Delivery Method): room air          POCT Blood Glucose.: 136 mg/dL (18 Jul 2022 14:44)    PHYSICAL EXAM:    GENERAL: Comfortable, no acute distress   HEAD:  Normocephalic, atraumatic  EYES: EOMI, PERRLA  HEENT: Moist mucous membranes  NECK: Supple, No JVD  NERVOUS SYSTEM:  sedated  CHEST/LUNG: Clear to auscultation bilaterally  HEART: Regular rate and rhythm  ABDOMEN: Soft, non tender, Nondistended, Bowel sounds present  GENITOURINARY: Voiding, no palpable bladder  EXTREMITIES:   No clubbing, cyanosis, or edema  MUSCULOSKELETAL- No muscle tenderness, no joint tenderness  SKIN-no rash      LABS: pending        IMPRESSION: 35 yo male with above pmh a/w:    #Morbid Obesity, BMI 53  # S/P lap gastric bypass  POD#0  pain control  IVF's  cl liqs per bariatric protocol  Monitor CBC/BMP  BGMs with SS  protonix IVP    #asthma, controlled/pulm sarcoidosis  albuterol prn    #HTN  vasotec prn    # CONSTANTINE  monitor O2 sats  supplement with O2 NC prn    #GERD  PPI IV    #VTE prophylaxis  Lovenox  venodynes BLE  AMB    Thank you for the consult, will follow with you               PCP:  Dr. Hernandez    CHIEF COMPLAINT:  Morbid Obesity    HPI: 34M, pmh of asthma, chronic urticaria, urinary hesitancy, GERD, HTN, chronic back pain, CONSTANTINE, uses CPAP, Pulmonary Sarcoidosis, with chronic sob/PHAM and morbid obesity, with BMI 53 who failed diet, exercise and usual modalities for weight loss and is now S/P Lap Gastric bypass.  Hospitalist service consulted for medical comanagement.   pt seen and examined this am. doing well. Minimal pain. ambulated multiple times. no wheezing. no difficulty voiding.    ROS: all 10 systems reviewed and is as above otherwise negative.     Vital Signs Last 24 Hrs  T(C): 36.4 (19 Jul 2022 08:40), Max: 36.9 (18 Jul 2022 17:00)  T(F): 97.5 (19 Jul 2022 08:40), Max: 98.5 (18 Jul 2022 18:07)  HR: 85 (19 Jul 2022 08:40) (70 - 96)  BP: 119/69 (19 Jul 2022 08:40) (117/65 - 152/77)  BP(mean): 95 (18 Jul 2022 20:24) (79 - 95)  RR: 18 (19 Jul 2022 08:40) (16 - 20)  SpO2: 98% (19 Jul 2022 08:40) (96% - 99%)      PHYSICAL EXAM:    GENERAL: Comfortable, no acute distress   HEAD:  Normocephalic, atraumatic  EYES: EOMI, PERRLA  HEENT: Moist mucous membranes  NECK: Supple, No JVD  NERVOUS SYSTEM:  A+OX3, non focal  CHEST/LUNG: Clear to auscultation bilaterally  HEART: Regular rate and rhythm  ABDOMEN: Soft, non tender, Nondistended, Bowel sounds present, lap sites intact with dermabond.   GENITOURINARY: Voiding, no palpable bladder  EXTREMITIES:   No clubbing, cyanosis, or edema  MUSCULOSKELETAL- No muscle tenderness, no joint tenderness  SKIN-no rash      LABS:                        13.8   13.27 )-----------( 240      ( 19 Jul 2022 07:06 )             41.8     07-19    137  |  108  |  8   ----------------------------<  114<H>  3.8   |  24  |  0.87    Ca    8.5      19 Jul 2022 07:06    ASSESSMENT AND PLAN:    35 yo male with above pmh a/w:    #Morbid Obesity, BMI 53  # S/P lap gastric bypass  -pod#1  -barbara clears  -ppi  -ivf  -incentive spirometry  -ambulate  -leukocytosis likely reactive as no s/s of infn at this time    #asthma, controlled/pulm sarcoidosis  albuterol prnf    #HTN  vasotec prn    # CONSTANTINE  -cpap HS    #GERD  PPI IV    #VTE prophylaxis  Lovenox  venodynes BLE  AMB

## 2022-07-19 NOTE — DISCHARGE NOTE PROVIDER - NSDCMRMEDTOKEN_GEN_ALL_CORE_FT
Allegra 24 Hour Allergy oral tablet: 1 tab(s) orally 2 times a day  Artificial Tears ophthalmic solution: 1 drop(s) to each affected eye 4 times a day, As Needed  Centrum oral tablet: 1 tab(s) orally once a day  Fish Oil oral capsule: orally once a day  levalbuterol 45 mcg/inh inhalation aerosol: 2 puff(s) inhaled every 4 hours, As Needed  Lexapro 5 mg oral tablet: 1 tab(s) orally once a day  omeprazole 40 mg oral delayed release capsule: 1 cap(s) orally 2 times a day

## 2022-07-19 NOTE — PROGRESS NOTE ADULT - SUBJECTIVE AND OBJECTIVE BOX
Post Op Day#: 1  Procedure:  Laparoscopic Gastric RNY Bypass    The patient is doing well without complaints.    Vital Signs Last 24 Hrs  T(C): 36.4 (19 Jul 2022 08:40), Max: 36.9 (18 Jul 2022 17:00)  T(F): 97.5 (19 Jul 2022 08:40), Max: 98.5 (18 Jul 2022 18:07)  HR: 85 (19 Jul 2022 08:40) (70 - 96)  BP: 119/69 (19 Jul 2022 08:40) (117/65 - 152/77)  BP(mean): 95 (18 Jul 2022 20:24) (79 - 95)  RR: 18 (19 Jul 2022 08:40) (16 - 20)  SpO2: 98% (19 Jul 2022 08:40) (96% - 99%)    Parameters below as of 19 Jul 2022 08:40  Patient On (Oxygen Delivery Method): room air        PHYSICAL EXAM:  General: NAD.  HEENT: no JVD, no jaundice.  LUNGS: CTAB.  Heart: S1 S2 RRR  Abd: soft nt/nd   Wounds: clean dry and intact                          13.8   13.27 )-----------( 240      ( 19 Jul 2022 07:06 )             41.8       07-19    137  |  108  |  8   ----------------------------<  114<H>  3.8   |  24  |  0.87    Ca    8.5      19 Jul 2022 07:06

## 2022-07-19 NOTE — CHART NOTE - NSCHARTNOTEFT_GEN_A_CORE
Pt educated on clear liquid bariatric diet. Pt w/o complaints. Pt recalled diet education, assessed knowledge. Compliance to diet expected.     *will monitor and adjust treatement plan prn*  Lorrie Colon MA, RDN, CDN, [facility] [phone]  Nutrition

## 2022-07-19 NOTE — DISCHARGE NOTE NURSING/CASE MANAGEMENT/SOCIAL WORK - PATIENT PORTAL LINK FT
You can access the FollowMyHealth Patient Portal offered by [facility] by registering at the following website: [email or web address]. By joining [facility]’s FollowMyHealth portal, you will also be able to view your health information using other applications (apps) compatible with our system.

## 2022-07-19 NOTE — DISCHARGE NOTE NURSING/CASE MANAGEMENT/SOCIAL WORK - NSDCPEFALRISK_GEN_ALL_CORE
For information on Fall & Injury Prevention, visit: [email or web address] OR  [email or web address] OR  https://www.cdc.gov/steadi/patient.html

## 2022-07-19 NOTE — DISCHARGE NOTE PROVIDER - HOSPITAL COURSE
Patient is an 31 yo M that underwent laparoscopic gastric RNY bypass without any complications. Patient is currently doing very well, pain controlled, and is tolerating phase I bariatric diet. Patient has had uncomplicated hospital course and is stable for discharge home with follow-up in the office in 2 weeks.

## 2022-07-19 NOTE — PROGRESS NOTE ADULT - PROBLEM SELECTOR PLAN 1
The patient is s/p lap gastric bypass and doing very well.  All discharge instructions were given to the patient, as well as potential signs of complications.  The patient will follow up in 2 weeks.  [facility]

## 2022-07-19 NOTE — DISCHARGE NOTE PROVIDER - CARE PROVIDER_API CALL
Homero Navarrete)  Surgery  [address], [address]  [place] [address]  Phone: [phone]  Fax: [phone]  Follow Up Time: 2 weeks

## 2022-07-20 NOTE — CHART NOTE - NSCHARTNOTEFT_GEN_A_CORE
Operative Note    Patient: Gorge Campbell  : 1990  Surgery date: 2022    Pre-Operative Diagnosis: Refractory morbid obesity with multiple comorbid conditions.    Post-operative Diagnosis: Same    Primary Procedure  [47300] Amando-En-Y Gastric Bypass - Laparoscopic     Secondary Procedure(s)  [62247] Uppr Gi Endoscopy, Diagnosis   [79090] Transversus Abdominis Plan (TAP) Block Bilateral     Surgeon: Homero Navarrete M.D., FACS   Assistant surgeon: Parish Corbett MD     Anesthesia type: General Anesthesia     ICD9 Code   Diagnosis   [D86]   SARCOIDOSIS (Stable)   [E66.01]   MORBID SEVERE OBES D/T EXCESS MISTY (Stable)   [G47.33]   OBSTRUCTIVE SLEEP APNEA (Stable)   [I10]   ESSENTIAL PRIMARY HYPERTENSION (Stable)   [K21.9]   GERD WITHOUT ESOPHAGITIS (Stable)   [Z68.43]   BODY MASS INDEX 50-59.9 ADULT (Stable)     Estimated blood loss: 30 ml     DRAINS: none    COMPLICATIONS: none    INDICATIONS FOR THE PROCEDURE:  The patient is a 32-year-old male who is morbidly obese with a body mass index of 53. The patient had multiple comorbidities due to his morbid obesity including obstuctive sleep apnea and hypertension. The patient tried multiple medical diets to try to lose weight but has been unable to lose any significant amount of weight, so surgical correction of her morbid obesity was indicated. The patient meets NIH criteria fro bariatric surgery and underwent appropriate preoperative workup, education, and signed the consent form freely. The patient had risks and benefits explained including, but not limited to, bleeding, leak, infection, disability, injury to transient structures, aspiration, DVT, pulmonary embolism, and death. The patient understood and agreed to proceed with surgery.    DESCRIPTION OF THE PROCEDURE:  The patient was identified properly via a time-out. The patient was brought into the operating room and was placed on the operating room table in supine position. The patient was then given general endotracheal anesthesia and was given preoperative antibiotics. Preoperative heparin was given in the ambulatory surgery unit. The patient was then prepped and draped in the usual sterile fashion. An Exparel mixture was mixed at the back table with 20 mL of Exparel, 30 mL of 0.25% Marcaine and 150 mL of normal saline. A Veress needle was placed in the left upper quadrant. The abdomen was insufflated to 15 millimeters of mercury. Once the abdomen was insufflated, the Exparel mixture was given subcutaneously at the sites of incision. An incision was made within the umbilicus and a 12-millimeter trocar was placed in the abdomen. The laparoscope was inserted and there was no injury on initial trocar placement or initial Veress needle placement. A Transversus Abdominus Plane Block was then conducted by placing 20 mL of the Exparel mixture preperitoneal at the distribution of the spinal nerves on the lateral abdominal wall. This was started at the costal margin at the mid axillary line. 20cc of the Exparel mixture was placed in this area. Another 20 mL was placed four centimeters caudal to this area. Another 20 mL was placed four centimeters caudal to this area and another 15 mL was placed four centimeters caudal to this area. This was repeated on the opposite side of the body in a similar fashion. The rest of the Exparel was placed into the subcutaneous tissues and preperitoneal at every trocar site. A 5-millimeter and 15-millimeter trocar was placed in the right upper quadrant. A 12-millimeter and 5-millimeter trocar was placed in left upper quadrant. An incision was made in subxiphoid area and a liver retractor was placed to hold the liver anteriorly and superiorly and was held in place using Mediflex device. The patient was then placed into steep reverse Trendelenburg position. The ligament of Treitz was identified after retracting the colon and greater omentum superiorly. The small bowel was measured for 50 cm from the ligament of Treitz and divided using a 60 mm I drive stapler using the tan load. The Amando limb was then measured at this point for 150 cm. The Amando limb was marked using a Penrose drain and was suture to the end of the Amando limb using a 2-0 Surgidac suture. The biliopancreatic limb and the Amando limb were then approximated using two sutures using the Endostitch device. Enterotomies were made in both these limbs. A single firing of the 60 mm purple load was then used to create a functional side to side anastomosis. The enterotomies sites were close using a running 2-0 Vicryl suture using the Endostitch device. On completion of the anastomosis, the mesenteric defects between the small bowel mesentery were closed with the 2-0 Surgidac suture using the Endostitch device. The greater omentum was then divided longitudinally to create space for the Amando limb to come into the anticolic and antigastric position. At this time, this section was done at the angle of His to create a window into the lesser sac using the harmonic scalpel. A window was then created in a perigastric fashion in the lesser sac along the lesser curvature between the second and third veins along the lesser curvature. At this time, the Orogastric tube and esophageal thermometer were removed. A 45 mm purple load was used to create a transverse cut into the stomach for the creation of the pouch and three other vertical firings using the same load were made to complete the 30 mL pouch. The Amando limb and the gastric pouch were then approximated using 2-0 Vicryl suture using the Endostitch device. Enterotomies measuring approximately 1 cm were made in both the gastric pouch and the Amando limb. A completely hand sewn anastomosis was then performed using an inner layer of running Vicryl suture and we then performed an outer layer of running 2-0 Vicryl suture in a Lembert fashion. After completion of the anastomosis, the Amando limb was clamped and in intraoperative endoscopy was performed. It was found that there was no bleeding or stricture at the anastomosis, and after pumping air and submerging the anastomosis underwater no leak was found. Hemostasis was assured. The abdominal cavity was irrigated and suctioned. Fibrin glue was applied to the staple lines. Satisfied with the surgery at this point, the liver retractor was removed under direct vision. The remaining trocars were then removed. The skin was then closed with running Monocryl sutures and dermabond was applied over that. The patient tolerated the procedure well. The patient was extubated in the operating room.    Disposition  To recovery room, VS stable. Operative Note    Patient: Gorge Campbell  : 1990  Surgery date: 2022    Pre-Operative Diagnosis: Refractory morbid obesity with multiple comorbid conditions.    Post-operative Diagnosis: Same    Primary Procedure  [25962] Amando-En-Y Gastric Bypass - Laparoscopic     Secondary Procedure(s)  [68968] Uppr Gi Endoscopy, Diagnosis   [15045] Transversus Abdominis Plan (TAP) Block Bilateral     Surgeon: Homero Navarrete M.D., FACS   Assistant surgeon: Parish Corbett MD     Anesthesia type: General Anesthesia     ICD9 Code   Diagnosis   [D86]   SARCOIDOSIS (Stable)   [E66.01]   MORBID SEVERE OBES D/T EXCESS MISTY (Stable)   [G47.33]   OBSTRUCTIVE SLEEP APNEA (Stable)   [I10]   ESSENTIAL PRIMARY HYPERTENSION (Stable)   [K21.9]   GERD WITHOUT ESOPHAGITIS (Stable)   [Z68.43]   BODY MASS INDEX 50-59.9 ADULT (Stable)     Estimated blood loss: 30 ml     DRAINS: none    COMPLICATIONS: none    INDICATIONS FOR THE PROCEDURE:  The patient is a 32-year-old male who is morbidly obese with a body mass index of 53. The patient had multiple comorbidities due to his morbid obesity including obstuctive sleep apnea and hypertension. The patient tried multiple medical diets to try to lose weight but has been unable to lose any significant amount of weight, so surgical correction of her morbid obesity was indicated. The patient meets NIH criteria fro bariatric surgery and underwent appropriate preoperative workup, education, and signed the consent form freely. The patient had risks and benefits explained including, but not limited to, bleeding, leak, infection, disability, injury to transient structures, aspiration, DVT, pulmonary embolism, and death. The patient understood and agreed to proceed with surgery.    DESCRIPTION OF THE PROCEDURE:  The patient was identified properly via a time-out. The patient was brought into the operating room and was placed on the operating room table in supine position. The patient was then given general endotracheal anesthesia and was given preoperative antibiotics. Preoperative heparin was given in the ambulatory surgery unit. The patient was then prepped and draped in the usual sterile fashion. An Exparel mixture was mixed at the back table with 20 mL of Exparel, 30 mL of 0.25% Marcaine and 150 mL of normal saline. A Veress needle was placed in the left upper quadrant. The abdomen was insufflated to 15 millimeters of mercury. Once the abdomen was insufflated, the Exparel mixture was given subcutaneously at the sites of incision. An incision was made within the umbilicus and a 12-millimeter trocar was placed in the abdomen. The laparoscope was inserted and there was no injury on initial trocar placement or initial Veress needle placement. A Transversus Abdominus Plane Block was then conducted by placing 20 mL of the Exparel mixture preperitoneal at the distribution of the spinal nerves on the lateral abdominal wall. This was started at the costal margin at the mid axillary line. 20cc of the Exparel mixture was placed in this area. Another 20 mL was placed four centimeters caudal to this area. Another 20 mL was placed four centimeters caudal to this area and another 15 mL was placed four centimeters caudal to this area. This was repeated on the opposite side of the body in a similar fashion. The rest of the Exparel was placed into the subcutaneous tissues and preperitoneal at every trocar site. A 5-millimeter and 15-millimeter trocar was placed in the right upper quadrant. A 12-millimeter and 5-millimeter trocar was placed in left upper quadrant. An incision was made in subxiphoid area and a liver retractor was placed to hold the liver anteriorly and superiorly and was held in place using Mediflex device. The patient was then placed into steep reverse Trendelenburg position. The ligament of Treitz was identified after retracting the colon and greater omentum superiorly. The small bowel was measured for 150 cm from the ligament of Treitz and divided using a 60 mm I drive stapler using the tan load. The Amando limb was then measured at this point for 150 cm. The biliopancreatic limb and the Amando limb were then approximated using two sutures using the Endostitch device. Enterotomies were made in both these limbs. A single firing of the 60 mm purple load was then used to create a functional side to side anastomosis. The enterotomies sites were close using a running 2-0 Vicryl suture using the Endostitch device. On completion of the anastomosis, the mesenteric defects between the small bowel mesentery were closed with the 2-0 Surgidac suture using the Endostitch device. The greater omentum was then divided longitudinally to create space for the Amando limb to come into the anticolic and antigastric position. At this time, this section was done at the angle of His to create a window into the lesser sac using the harmonic scalpel. A window was then created in a perigastric fashion in the lesser sac along the lesser curvature between the second and third veins along the lesser curvature. At this time, the Orogastric tube and esophageal thermometer were removed. A 45 mm purple load was used to create a transverse cut into the stomach for the creation of the pouch and three other vertical firings using the same load were made to complete the 30 mL pouch. The Amando limb and the gastric pouch were then approximated using 2-0 Vicryl suture using the Endostitch device. Enterotomies measuring approximately 1 cm were made in both the gastric pouch and the Amando limb. A completely hand sewn anastomosis was then performed using an inner layer of running Vicryl suture and we then performed an outer layer of running 2-0 Vicryl suture in a Lembert fashion. After completion of the anastomosis, the Amando limb was clamped and in intraoperative endoscopy was performed. It was found that there was no bleeding or stricture at the anastomosis, and after pumping air and submerging the anastomosis underwater no leak was found. Hemostasis was assured. The abdominal cavity was irrigated and suctioned. Fibrin glue was applied to the staple lines. Satisfied with the surgery at this point, the liver retractor was removed under direct vision. The remaining trocars were then removed. The skin was then closed with running Monocryl sutures and dermabond was applied over that. The patient tolerated the procedure well. The patient was extubated in the operating room.    Disposition  To recovery room, VS stable.

## 2022-07-21 DIAGNOSIS — K21.9 GASTRO-ESOPHAGEAL REFLUX DISEASE WITHOUT ESOPHAGITIS: ICD-10-CM

## 2022-07-21 DIAGNOSIS — J45.909 UNSPECIFIED ASTHMA, UNCOMPLICATED: ICD-10-CM

## 2022-07-21 DIAGNOSIS — K64.9 UNSPECIFIED HEMORRHOIDS: ICD-10-CM

## 2022-07-21 DIAGNOSIS — M51.26 OTHER INTERVERTEBRAL DISC DISPLACEMENT, LUMBAR REGION: ICD-10-CM

## 2022-07-21 DIAGNOSIS — G47.30 SLEEP APNEA, UNSPECIFIED: ICD-10-CM

## 2022-07-21 DIAGNOSIS — I10 ESSENTIAL (PRIMARY) HYPERTENSION: ICD-10-CM

## 2022-07-21 DIAGNOSIS — E66.01 MORBID (SEVERE) OBESITY DUE TO EXCESS CALORIES: ICD-10-CM

## 2022-07-21 DIAGNOSIS — D86.9 SARCOIDOSIS, UNSPECIFIED: ICD-10-CM

## 2022-11-25 ENCOUNTER — NON-APPOINTMENT (OUTPATIENT)
Age: 32
End: 2022-11-25

## 2022-11-28 PROBLEM — D86.9 SARCOIDOSIS, UNSPECIFIED: Chronic | Status: ACTIVE | Noted: 2022-07-13

## 2022-12-01 ENCOUNTER — APPOINTMENT (OUTPATIENT)
Dept: ORTHOPEDIC SURGERY | Facility: CLINIC | Age: 32
End: 2022-12-01

## 2023-01-11 NOTE — DISCHARGE NOTE PROVIDER - CARE PROVIDERS DIRECT ADDRESSES
MD ATTESTATION NOTE - Observation progress    The YESENIA and I have discussed this patient's history, physical exam, and treatment plan.  I have reviewed the documentation and personally had a face to face interaction with the patient. I affirm the documentation and agree with the treatment and plan.  The attached note describes my personal findings.      I provided a substantive portion of the care of the patient.  I personally performed the physical exam in its entirety, and below are my findings.  For this patient encounter, the patient wore surgical mask, I wore full protective PPE including N95 and eye protection.      Brief HPI: Patient admitted to the observation unit for evaluation of chest pain.  Patient states the symptoms have been present for several weeks but do seem to be accelerating.  Pain is centered right.  States significant discomfort with exertion better with rest.  Does radiate to his shoulders.  Patient's initial troponin was barely elevated however repeat troponins have been normal.  Patient's CT scan was negative.  Patient has been comfortable in the observation unit since arrival.    PHYSICAL EXAM  ED Triage Vitals   Temp Heart Rate Resp BP SpO2   01/10/23 1645 01/10/23 1645 01/10/23 1645 01/10/23 1703 01/10/23 1645   97.4 °F (36.3 °C) 90 16 137/79 98 %      Temp src Heart Rate Source Patient Position BP Location FiO2 (%)   01/10/23 1645 01/10/23 1645 01/10/23 2254 01/10/23 2254 --   Tympanic Monitor Lying Right arm          GENERAL: no acute distress  HENT: nares patent  EYES: no scleral icterus  CV: regular rhythm, normal rate  RESPIRATORY: normal effort  ABDOMEN: soft  MUSCULOSKELETAL: no deformity  NEURO: alert, moves all extremities, follows commands  PSYCH:  calm, cooperative  SKIN: warm, dry    Vital signs and nursing notes reviewed.        Plan: Cardiology evaluation for further management     ,DirectAddress_Unknown

## 2023-10-06 NOTE — ASU PREOP CHECKLIST - TAMPON REMOVED
As per Dr. Kam Mclean goal for pt is 92-93%. Verbally ordered to decrease fio2 settings to 30%; pt is maintaining 93%. Respiratory staff informed about the change.      Milagro Mejia RN  10/06/23 2423 n/a

## 2024-01-12 ENCOUNTER — APPOINTMENT (OUTPATIENT)
Dept: COLORECTAL SURGERY | Facility: CLINIC | Age: 34
End: 2024-01-12
Payer: COMMERCIAL

## 2024-01-12 VITALS
SYSTOLIC BLOOD PRESSURE: 160 MMHG | HEART RATE: 81 BPM | OXYGEN SATURATION: 97 % | HEIGHT: 68 IN | BODY MASS INDEX: 32.74 KG/M2 | DIASTOLIC BLOOD PRESSURE: 105 MMHG | RESPIRATION RATE: 16 BRPM | WEIGHT: 216 LBS

## 2024-01-12 DIAGNOSIS — K61.0 ANAL ABSCESS: ICD-10-CM

## 2024-01-12 DIAGNOSIS — K60.3 ANAL FISTULA: ICD-10-CM

## 2024-01-12 PROCEDURE — 99214 OFFICE O/P EST MOD 30 MIN: CPT

## 2024-01-12 RX ORDER — AMOXICILLIN AND CLAVULANATE POTASSIUM 875; 125 MG/1; MG/1
875-125 TABLET, COATED ORAL
Qty: 28 | Refills: 1 | Status: ACTIVE | COMMUNITY
Start: 2024-01-12 | End: 1900-01-01

## 2024-01-13 PROBLEM — K60.3 ANAL FISTULA: Status: ACTIVE | Noted: 2024-01-13

## 2024-01-13 NOTE — ASSESSMENT
[FreeTextEntry1] : 33-year-old male with chronic perirectal abscess and fistula.  Recommend antibiotics Augmentin twice daily, surgery with examination anesthesia with incision and drainage of abscess fistulotomy possible seton placement.  Presurgical testing with CBC BMP PT PTT chest x-ray EKG prophylactic antibiotics DVT prophylaxis, recommend high fiber diet, Metamucil daily, sitz baths, stool softeners, pain medications p.r.n.

## 2024-01-13 NOTE — REVIEW OF SYSTEMS
[As Noted in HPI] : as noted in HPI [Negative] : Heme/Lymph [FreeTextEntry7] : Perirectal discomfort and drainage from abscess fistula

## 2024-01-13 NOTE — HISTORY OF PRESENT ILLNESS
[FreeTextEntry1] : 33-year-old male with complaints of a new perirectal abscess and fistula symptoms have been there for several months but worsening.  Originally thought to be a thrombosed hemorrhoid which then developed into an abscess fistula.

## 2024-01-13 NOTE — PHYSICAL EXAM
[Abdomen Masses] : No abdominal masses [Abdomen Tenderness] : ~T No ~M abdominal tenderness [Normal rectal exam] : exam was normal [Fistula] : a fistula [Tender, Swollen] : tender, swollen [Normal] : was normal [Left Side] : on the left [None] : there was no rectal mass  [Normal Breath Sounds] : Normal breath sounds [Normal Heart Sounds] : normal heart sounds [Normal Rate and Rhythm] : normal rate and rhythm [Alert] : alert [Oriented to Person] : oriented to person [Oriented to Place] : oriented to place [Oriented to Time] : oriented to time [Calm] : calm [de-identified] : Looks well in no distress, of stated age. [de-identified] : Pupils equal reactive to light normocephalic atraumatic.

## 2024-01-30 ENCOUNTER — APPOINTMENT (OUTPATIENT)
Dept: PEDIATRICS | Facility: CLINIC | Age: 34
End: 2024-01-30
Payer: COMMERCIAL

## 2024-01-30 DIAGNOSIS — Z76.81 EXPECTANT PARENT(S) PREBIRTH PEDIATRICIAN VISIT: ICD-10-CM

## 2024-01-30 PROCEDURE — ZZZZZ: CPT

## 2024-01-30 NOTE — HISTORY OF PRESENT ILLNESS
[FreeTextEntry6] : Z76.81 Pediatric Prebirth Visit 16252 Consult Inquiries for Prenatal problems that may have implications for the baby reviewed Breast-feeding best practices, benefits, risks of not breast-feeding also reviewed.  [email or web address] to access good information regarding breast-feeding in the first 3 days of life and beyond provided.  Fist day and days of breast feeding summarized.   Importance of maternal self care as a tactic to improve breast feeding, including fixing painful latching, was emphasized. Risks and benefits of vaccines as well as not vaccinating within the timeframe recommended was reviewed.  This included the hepatitis B vaccine in the hospital that will be recommended. How to get in touch with the doctor on-call, and how to access the right online care in a de-escalating manner was reviewed.  Especially, through [email or web address], tools tab, and symptom .  This may decrease the perceived need for unnecessary access to care, and direct toward urgent care more when appropriate.  For example, 100.2 F temperature rectally is not necessarily a fever. Generic searches of fever in an infant may direct parents to emergent care unnecessarily.  A variety of other questions were reviewed regarding newborns and  beyond that.   Age and development based approach to parenting reviewed.  We will see the baby within approximately  2 days of leaving the hospital. .

## 2024-02-15 ENCOUNTER — NON-APPOINTMENT (OUTPATIENT)
Age: 34
End: 2024-02-15

## 2025-03-12 ENCOUNTER — APPOINTMENT (OUTPATIENT)
Dept: ORTHOPEDIC SURGERY | Facility: CLINIC | Age: 35
End: 2025-03-12